# Patient Record
Sex: FEMALE | Race: WHITE | HISPANIC OR LATINO | URBAN - METROPOLITAN AREA
[De-identification: names, ages, dates, MRNs, and addresses within clinical notes are randomized per-mention and may not be internally consistent; named-entity substitution may affect disease eponyms.]

---

## 2022-11-23 ENCOUNTER — TELEPHONE (OUTPATIENT)
Dept: OBSTETRICS AND GYNECOLOGY | Facility: CLINIC | Age: 27
End: 2022-11-23

## 2022-11-23 NOTE — TELEPHONE ENCOUNTER
Pt is 27 weeks pregnant, new to the area, and wanted to come to our office for care. She's from [facility] and has had no prenatal care. Please advise.

## 2022-12-06 ENCOUNTER — INITIAL PRENATAL (OUTPATIENT)
Dept: OBSTETRICS AND GYNECOLOGY | Facility: CLINIC | Age: 27
End: 2022-12-06

## 2022-12-06 VITALS — SYSTOLIC BLOOD PRESSURE: 102 MMHG | WEIGHT: 135 LBS | DIASTOLIC BLOOD PRESSURE: 62 MMHG

## 2022-12-06 DIAGNOSIS — Z23 NEED FOR INFLUENZA VACCINATION: ICD-10-CM

## 2022-12-06 DIAGNOSIS — Z23 NEED FOR TDAP VACCINATION: ICD-10-CM

## 2022-12-06 DIAGNOSIS — Z36.9 ENCOUNTER FOR ANTENATAL SCREENING, UNSPECIFIED: Primary | ICD-10-CM

## 2022-12-06 LAB
GLUCOSE UR STRIP-MCNC: NEGATIVE MG/DL
PROT UR STRIP-MCNC: NEGATIVE MG/DL

## 2022-12-06 PROCEDURE — 90471 IMMUNIZATION ADMIN: CPT | Performed by: STUDENT IN AN ORGANIZED HEALTH CARE EDUCATION/TRAINING PROGRAM

## 2022-12-06 PROCEDURE — 90472 IMMUNIZATION ADMIN EACH ADD: CPT | Performed by: STUDENT IN AN ORGANIZED HEALTH CARE EDUCATION/TRAINING PROGRAM

## 2022-12-06 PROCEDURE — 90686 IIV4 VACC NO PRSV 0.5 ML IM: CPT | Performed by: STUDENT IN AN ORGANIZED HEALTH CARE EDUCATION/TRAINING PROGRAM

## 2022-12-06 PROCEDURE — 90715 TDAP VACCINE 7 YRS/> IM: CPT | Performed by: STUDENT IN AN ORGANIZED HEALTH CARE EDUCATION/TRAINING PROGRAM

## 2022-12-06 PROCEDURE — 99204 OFFICE O/P NEW MOD 45 MIN: CPT | Performed by: STUDENT IN AN ORGANIZED HEALTH CARE EDUCATION/TRAINING PROGRAM

## 2022-12-06 RX ORDER — PRENATAL VIT/IRON FUM/FOLIC AC 27MG-0.8MG
TABLET ORAL DAILY
COMMUNITY

## 2022-12-06 NOTE — PROGRESS NOTES
Confirmation of pregnancy     Chief Complaint   Patient presents with   • Initial Prenatal Visit         Estrellita Bobby is being seen today for evaluation of absence of menses; late to care . Due to her period being late, she tested for pregnancy and had + home UPT. She is a 27 y.o. . This problem is new to me, the examiner.       LNMP: Unsure ; thinks she is 27wga     Taking prenatal vitamins: Yes. Needs RX: Yes  Planned pregnancy: Yes  Prior obstetric issues, potential pregnancy concerns: G1  Family history of genetic issues (includes FOB): Denies  Varicella Hx: ?   Flu vaccine: Today   COVID 19 vaccine: Unsure.   History of STDs: Denies  Current medications: PNV  Last pap smear: Never ( Today)   Smoker: No  Drug or alcohol abuse: No  H/O physical, emotional or sexual abuse:Denies  H/O mental health disorder: Denies  Prior testing for Cystic Fibrosis Carrier or Sickle Cell Trait- No  Prepregnancy BMI - There is no height or weight on file to calculate BMI.    No past medical history on file.    No past surgical history on file.      Current Outpatient Medications:   •  Prenatal Vit-Fe Fumarate-FA (prenatal vitamin 27-0.8) 27-0.8 MG tablet tablet, Take  by mouth Daily., Disp: , Rfl:     No Known Allergies    Social History     Socioeconomic History   • Marital status: Significant Other       No family history on file.    Review of systems     Review of Systems  See HPI     Objective    /62   Wt 61.2 kg (135 lb)     Physical Exam    PE NOB   Vitals: VSS; AF    General Appearance:  Awake. Alert. Well developed. Well nourished. In no acute distress.    Neck:  The neck was normal; no abnormalities noted on palpation.  Trachea: midline and showed no abnormalities.  Lymph Nodes:  Nontender; no lymphadenopathy  Breasts:  General/bilateral:  ° Nipples showed no abnormalities. ° No breast asymmetry was observed. ° No scar on breast. ° No breast mass was found. ° No tenderness of the breast.  Lungs:  °  Clear to auscultation bilaterally without wheezes, rales or rhonchi.  Cardiovascular:  ° System: RRR, no murmur, consistent with normal pregnancy.  Abdomen:  Visual Inspection: ° Abdomen was normal on visual inspection.  Palpation: ° Abdomen was soft. ° Abdominal non-tender.  Genitalia:  External: ° Vulva was normal. ° Labia showed no abnormalities. ° Clitoris was normal. ° Arab's gland was normal. ° Bartholin's gland was normal. ° Genitalia exhibited no lesion.  Vagina: ° Mucosa was normal. ° Not tender. ° No vaginal mass was observed.  Cervix: ° No cervical discharge. ° Not tender.  Uterus: ° Fundal height was normal for gestational age. ° Not tender.  Uterine Adnexae: ° Normal without masses or tenderness.  Neurological:  ° Oriented to time, place, and person.  Skin:  ° General appearance was normal. No bruising or ecchymosis.  Obstetrical:     Presentation: VTX  Placenta: Anterior  MASON: 15cm       EFW  30+1 BIBIANA [id number]     Anatomy appears WNL; spine views sub optimal.       Assessment/Plan      1. Missed menses:BIBIANA [id number] with late to care DUS Oriented to practice.     2. Pregnancy: Disc importance of regular prenatal care. Enc PNV daily. Counseled on providers and on call phone. Disc Tylenol products are ok and encouraged no ibuprofen or ASA in pregnancy.  Disc exercise in pregnancy, diet, expected weight gain, etc. Enc no use of tobacco, vaping, drugs, or alcohol during pregnancy. Rev warn s/s of SAB.     3. Labs: Pt counseled on genetic screening, Quad screen, AFP, and NIPS. NIPS only to late for AFP     BMI cannot be calculated due to outdated height or weight values.  Please input a current height/weight in Vitals and re-renter BMIFOLLOWUP in Note to pull in correct documentation based on BMI range.  4.     5. Smoker- No     6.   COVID19 precautions were reviewed with the patient. Continue to encourage social distancing, wearing a mask, and good hand hygiene.  I wore a mask, protective eye wear, and  gloves during this patient encounter.  Patient also wearing a surgical mask and social distancing was observed. Hand hygeine performed before and after seeing the patient. Also encouraged COVID booster vaccine at 6 month interval from last COVID vaccine. Info provided on Covid vaccine: Declines     7.  Flu vaccine. Encouraged the flu vaccine during pregnancy. Discuss normal physiological changes during pregnancy increase the susceptibility of the flu virus and increase the risk of severe illness for the pregnant woman. Disc flu can be harmful to the unborn baby as well. Enc the flu vaccine. Disc with patient that getting the flu vaccine is the first and most important step in protecting against the flu. Flu vaccines given during pregnancy help protect both the mother and the baby. Getting the flu vaccine during pregnancy also helps protect the  from flu illness for several months after their birth, when then are too young to get vaccinated. Also disc the importance of good hand hygiene and avoiding people who are sick. The patient received the flu vaccine.     8. Tdap today     9. Pap screen today           All questions answered.     Counseling was given to patient for the following topics: diagnostic results, prognosis and patient and family education . Total time of the encounter was 30 minutes and 30 minutes was spend counseling.    Encounter Diagnoses   Name Primary?   • Encounter for  screening, unspecified Yes       Diagnoses and all orders for this visit:    Encounter for  screening, unspecified  -     Gestational GTT 2 Hr ([facility])  -     Cancel: Hemoglobin & Hematocrit, Blood  -     Cancel: Obstetric Panel  -     Cancel: HIV-1 / O / 2 Ag / Antibody 4th Generation  -     Cancel: Urine Culture - Urine, Urine, Random Void  -     Cancel: Hemoglobin A1c  -     Cancel: Urine Drug Screen - Urine, Clean Catch  -     Cancel: Varicella Zoster Antibody, IgG  -     Cancel: Hemoglobinopathy  Fractionation Cascade  -     Cancel: IGP,CtNgTv,rfx Aptima HPV ASCU  -     Obstetric Panel  -     HIV-1 / O / 2 Ag / Antibody 4th Generation  -     Urine Culture - Urine, Urine, Random Void  -     Hemoglobin A1c  -     Urine Drug Screen - Urine, Random Void  -     Varicella Zoster Antibody, IgG  -     Hemoglobinopathy Fractionation Cascade  -     IGP,CtNgTv,rfx Aptima HPV ASCU    Other orders  -     Prenatal Vit-Fe Fumarate-FA (prenatal vitamin 27-0.8) 27-0.8 MG tablet tablet; Take  by mouth Daily.  -     POC Urinalysis Dipstick        RTO in 2 weeks for new OB exam, labs and dating ultrasound.   Can perform growth; risk for GDM is  race. EFW at 34wga recommended ; A1c pending ; late to care for gtt     Jericho Ceron DO  12/6/2022  09:21 EST

## 2022-12-07 LAB
ABO GROUP BLD: ABNORMAL
BASOPHILS # BLD AUTO: 0 X10E3/UL (ref 0–0.2)
BASOPHILS NFR BLD AUTO: 0 %
BLD GP AB SCN SERPL QL: NEGATIVE
EOSINOPHIL # BLD AUTO: 0 X10E3/UL (ref 0–0.4)
EOSINOPHIL NFR BLD AUTO: 0 %
ERYTHROCYTE [DISTWIDTH] IN BLOOD BY AUTOMATED COUNT: 13.7 % (ref 11.7–15.4)
HBA1C MFR BLD: 5.1 % (ref 4.8–5.6)
HBV SURFACE AG SERPL QL IA: NEGATIVE
HCT VFR BLD AUTO: 32.5 % (ref 34–46.6)
HCV AB S/CO SERPL IA: <0.1 S/CO RATIO (ref 0–0.9)
HGB A MFR BLD ELPH: 97.3 % (ref 96.4–98.8)
HGB A2 MFR BLD ELPH: 2.7 % (ref 1.8–3.2)
HGB BLD-MCNC: 10.4 G/DL (ref 11.1–15.9)
HGB F MFR BLD ELPH: 0 % (ref 0–2)
HGB FRACT BLD-IMP: NORMAL
HGB S MFR BLD ELPH: 0 %
HIV 1+2 AB+HIV1 P24 AG SERPL QL IA: NON REACTIVE
IMM GRANULOCYTES # BLD AUTO: 0 X10E3/UL (ref 0–0.1)
IMM GRANULOCYTES NFR BLD AUTO: 0 %
LYMPHOCYTES # BLD AUTO: 1.4 X10E3/UL (ref 0.7–3.1)
LYMPHOCYTES NFR BLD AUTO: 20 %
MCH RBC QN AUTO: 27.6 PG (ref 26.6–33)
MCHC RBC AUTO-ENTMCNC: 32 G/DL (ref 31.5–35.7)
MCV RBC AUTO: 86 FL (ref 79–97)
MONOCYTES # BLD AUTO: 0.4 X10E3/UL (ref 0.1–0.9)
MONOCYTES NFR BLD AUTO: 5 %
NEUTROPHILS # BLD AUTO: 5 X10E3/UL (ref 1.4–7)
NEUTROPHILS NFR BLD AUTO: 75 %
PLATELET # BLD AUTO: 361 X10E3/UL (ref 150–450)
RBC # BLD AUTO: 3.77 X10E6/UL (ref 3.77–5.28)
RH BLD: POSITIVE
RPR SER QL: NON REACTIVE
RUBV IGG SERPL IA-ACNC: 2.72 INDEX
VZV IGG SER IA-ACNC: 169 INDEX
WBC # BLD AUTO: 6.8 X10E3/UL (ref 3.4–10.8)

## 2022-12-07 RX ORDER — DOXYCYCLINE HYCLATE 50 MG/1
324 CAPSULE, GELATIN COATED ORAL
Qty: 30 TABLET | Refills: 6 | Status: SHIPPED | OUTPATIENT
Start: 2022-12-07 | End: 2023-01-23 | Stop reason: SDUPTHER

## 2022-12-08 ENCOUNTER — PATIENT OUTREACH (OUTPATIENT)
Dept: LABOR AND DELIVERY | Facility: HOSPITAL | Age: 27
End: 2022-12-08

## 2022-12-08 ENCOUNTER — REFERRAL TRIAGE (OUTPATIENT)
Dept: LABOR AND DELIVERY | Facility: HOSPITAL | Age: 27
End: 2022-12-08

## 2022-12-08 LAB
AMPHETAMINES UR QL SCN: NEGATIVE NG/ML
BACTERIA UR CULT: NO GROWTH
BACTERIA UR CULT: NORMAL
BARBITURATES UR QL SCN: NEGATIVE NG/ML
BENZODIAZ UR QL SCN: NEGATIVE NG/ML
BZE UR QL SCN: NEGATIVE NG/ML
CANNABINOIDS UR QL SCN: NEGATIVE NG/ML
CREAT UR-MCNC: 68.7 MG/DL (ref 20–300)
LABORATORY COMMENT REPORT: NORMAL
METHADONE UR QL SCN: NEGATIVE NG/ML
OPIATES UR QL SCN: NEGATIVE NG/ML
OXYCODONE+OXYMORPHONE UR QL SCN: NEGATIVE NG/ML
PCP UR QL: NEGATIVE NG/ML
PH UR: 7.4 [PH] (ref 4.5–8.9)
PROPOXYPH UR QL SCN: NEGATIVE NG/ML

## 2022-12-08 NOTE — OUTREACH NOTE
[facility]  Unable to Reach       Questions/Answers    Flowsheet Row Responses   Pending Outreach Confirm Patient Interest   Call Attempt First   Outcome Left message   Next Call Attempt Date 12/15/22          No mychart to send msg.    Davey Blum RN  Maternity Nurse Navigator    12/8/2022, 12:36 EST

## 2022-12-10 LAB
CFDNA.FET/CFDNA.TOTAL SFR FETUS: NORMAL %
CITATION REF LAB TEST: NORMAL
FET 13+18+21+X+Y ANEUP PLAS.CFDNA: NEGATIVE
FET CHR 21 TS PLAS.CFDNA QL: NEGATIVE
FET SEX PLAS.CFDNA DOSAGE CFDNA: NORMAL
FET TS 13 RISK PLAS.CFDNA QL: NEGATIVE
FET TS 18 RISK WBC.DNA+CFDNA QL: NEGATIVE
GA EST FROM CONCEPTION DATE: NORMAL D
GESTATIONAL AGE > 9:: YES
LAB DIRECTOR NAME PROVIDER: NORMAL
LAB DIRECTOR NAME PROVIDER: NORMAL
LABORATORY COMMENT REPORT: NORMAL
LIMITATIONS OF THE TEST: NORMAL
NEGATIVE PREDICTIVE VALUE: NORMAL
NOTE: NORMAL
PERFORMANCE CHARACTERISTICS: NORMAL
POSITIVE PREDICTIVE VALUE: NORMAL
REF LAB TEST METHOD: NORMAL
TEST PERFORMANCE INFO SPEC: NORMAL

## 2022-12-12 LAB
C TRACH RRNA CVX QL NAA+PROBE: NEGATIVE
CONV .: NORMAL
CYSTIC FIBROSIS MUTATION 97: NORMAL
CYTOLOGIST CVX/VAG CYTO: NORMAL
CYTOLOGY CVX/VAG DOC CYTO: NORMAL
CYTOLOGY CVX/VAG DOC THIN PREP: NORMAL
DX ICD CODE: NORMAL
GENE DIS ANL CARRIER INTERP-IMP: NORMAL
HIV 1 & 2 AB SER-IMP: NORMAL
N GONORRHOEA RRNA CVX QL NAA+PROBE: NEGATIVE
OTHER STN SPEC: NORMAL
STAT OF ADQ CVX/VAG CYTO-IMP: NORMAL
T VAGINALIS RRNA SPEC QL NAA+PROBE: NEGATIVE

## 2022-12-14 LAB
CLINICAL GENETICS COUNSELING NOTE: NORMAL
CLINICAL INFO: NORMAL
ETHNIC BACKGROUND STATED: NORMAL
FMR1 GENE CGG RPT BLD/T QL: NORMAL
LAB DIRECTOR NAME PROVIDER: NORMAL
LABORATORY COMMENT REPORT: NORMAL
LABORATORY COMMENT REPORT: NORMAL
REASON FOR REFERRAL (NARRATIVE): NORMAL
REF LAB TEST METHOD: NORMAL
SMN1 GENE MUT ANL BLD/T: NORMAL
SPECIMEN SOURCE: NORMAL
TEST PERFORMANCE INFO SPEC: NORMAL
TEST PERFORMANCE INFO SPEC: NORMAL

## 2022-12-15 ENCOUNTER — PATIENT OUTREACH (OUTPATIENT)
Dept: LABOR AND DELIVERY | Facility: HOSPITAL | Age: 27
End: 2022-12-15

## 2022-12-15 NOTE — OUTREACH NOTE
[facility]  Unable to Reach       Questions/Answers    Flowsheet Row Responses   Pending Outreach Confirm Patient Interest   Call Attempt Second   Outcome Not available   Next Call Attempt Date 02/01/23          F/U 2/1/23, which closer to BIBIANA. UTR x2.    Davey Blum, RN  Maternity Nurse Navigator    12/15/2022, 12:31 EST

## 2022-12-19 ENCOUNTER — ROUTINE PRENATAL (OUTPATIENT)
Dept: OBSTETRICS AND GYNECOLOGY | Facility: CLINIC | Age: 27
End: 2022-12-19

## 2022-12-19 VITALS — SYSTOLIC BLOOD PRESSURE: 118 MMHG | DIASTOLIC BLOOD PRESSURE: 70 MMHG | WEIGHT: 138 LBS

## 2022-12-19 DIAGNOSIS — D50.9 IRON DEFICIENCY ANEMIA DURING PREGNANCY: Primary | ICD-10-CM

## 2022-12-19 DIAGNOSIS — Z3A.32 32 WEEKS GESTATION OF PREGNANCY: ICD-10-CM

## 2022-12-19 DIAGNOSIS — O99.019 IRON DEFICIENCY ANEMIA DURING PREGNANCY: Primary | ICD-10-CM

## 2022-12-19 DIAGNOSIS — Z78.9 USES SPANISH AS PRIMARY SPOKEN LANGUAGE: ICD-10-CM

## 2022-12-19 DIAGNOSIS — O09.30: ICD-10-CM

## 2022-12-19 LAB
GLUCOSE UR STRIP-MCNC: NEGATIVE MG/DL
PROT UR STRIP-MCNC: NEGATIVE MG/DL

## 2022-12-19 PROCEDURE — 99214 OFFICE O/P EST MOD 30 MIN: CPT | Performed by: STUDENT IN AN ORGANIZED HEALTH CARE EDUCATION/TRAINING PROGRAM

## 2022-12-19 NOTE — PROGRESS NOTES
Ob follow up      Estrellita Bobby is a 27 y.o.  32w0d patient being seen today for her obstetrical visit for spine views on US . Patient reports no complaints. Fetal movement: normal.       ROS - Denies leaking fluid, vaginal bleeding and notes good fetal movement.     /70   Wt 62.6 kg (138 lb)     Vitals: VSS; AF    General Appearance:  Awake. Alert. Well developed. Well nourished. In no acute distress.    Visual Inspection: ° Abdomen was normal on visual inspection.  Palpation: ° Abdomen was soft. ° Abdominal non-tender.    Uterus: ° Fundal height was normal for gestational age. ° Not tender.  Uterine Adnexae: ° Normal without masses or tenderness.  Neurological:  ° Oriented to time, place, and person.  Skin:  ° General appearance was normal. No bruising or ecchymosis.  Obstetrical:    Presentation: VTX  Placenta: Anterior  MASON: 14.06cm  CVX length: 4.91cm  's and + FM     Anatomy appears WNL; Spine views WNL today     A/P      1) 26 y.o.  -pregnancy at 32+0 RH+  Spine views today   NIPT low risk ( Missed AFP)        2) flu vaccine received     3) Low risk  CF/SMA/FX     4) Late to care missed 2hr gtt   A1c WNL 5.1  Growth 36wga ( )      5) Kinyarwanda primary language      6) Tdap received    7) Anemia   Rx      8)Reviewed this stage of pregnancy     9)Problem list updated              Plan    PTL warnings   Kick counts reviewed   FU 2 weeks, Growth 36wga     I spent 30 minutes caring for Estrellita on this date of service. This time includes time spent by me in the following activities: preparing for the visit, reviewing tests, obtaining and/or reviewing a separately obtained history, performing a medically appropriate examination and/or evaluation, counseling and educating the patient/family/caregiver, ordering medications, tests, or procedures, documenting information in the medical record, independently interpreting results and communicating that information with the  patient/family/caregiver and care coordination       Jericho Ceron, DO     12/19/2022  10:35 EST

## 2023-01-04 ENCOUNTER — ROUTINE PRENATAL (OUTPATIENT)
Dept: OBSTETRICS AND GYNECOLOGY | Facility: CLINIC | Age: 28
End: 2023-01-04
Payer: MEDICAID

## 2023-01-04 VITALS — SYSTOLIC BLOOD PRESSURE: 112 MMHG | DIASTOLIC BLOOD PRESSURE: 60 MMHG | WEIGHT: 138.6 LBS

## 2023-01-04 DIAGNOSIS — O99.019 IRON DEFICIENCY ANEMIA DURING PREGNANCY: ICD-10-CM

## 2023-01-04 DIAGNOSIS — Z3A.34 34 WEEKS GESTATION OF PREGNANCY: ICD-10-CM

## 2023-01-04 DIAGNOSIS — Z78.9 USES SPANISH AS PRIMARY SPOKEN LANGUAGE: Primary | ICD-10-CM

## 2023-01-04 DIAGNOSIS — D50.9 IRON DEFICIENCY ANEMIA DURING PREGNANCY: ICD-10-CM

## 2023-01-04 DIAGNOSIS — O09.30: ICD-10-CM

## 2023-01-04 PROBLEM — Z3A.32 32 WEEKS GESTATION OF PREGNANCY: Status: RESOLVED | Noted: 2022-12-19 | Resolved: 2023-01-04

## 2023-01-04 LAB
GLUCOSE UR STRIP-MCNC: NEGATIVE MG/DL
PROT UR STRIP-MCNC: NEGATIVE MG/DL

## 2023-01-04 PROCEDURE — 99213 OFFICE O/P EST LOW 20 MIN: CPT | Performed by: STUDENT IN AN ORGANIZED HEALTH CARE EDUCATION/TRAINING PROGRAM

## 2023-01-04 NOTE — PROGRESS NOTES
Ob follow up      Estrellita Bobby is a 27 y.o.  34w2d patient being seen today for her obstetrical visit  . Patient reports no complaints. Fetal movement: normal.       ROS - Denies leaking fluid, vaginal bleeding and notes good fetal movement.     /60   Wt 62.9 kg (138 lb 9.6 oz)     Vitals: VSS; AF    General Appearance:  Awake. Alert. Well developed. Well nourished. In no acute distress.    Visual Inspection: ° Abdomen was normal on visual inspection.  Palpation: ° Abdomen was soft. ° Abdominal non-tender.    Uterus: ° Fundal height was normal for gestational age. ° Not tender.  Uterine Adnexae: ° Normal without masses or tenderness.  Neurological:  ° Oriented to time, place, and person.  Skin:  ° General appearance was normal. No bruising or ecchymosis.  Obstetrical:  + FM and FCA     A/P      1) 26 y.o.  -pregnancy at 34+2     NIPT low risk ( Missed AFP)        2) flu vaccine received     3) Low risk  CF/SMA/FX     4) Late to care missed 2hr gtt   A1c WNL 5.1  Growth 36wga ( )      5) French primary language      6) Tdap received    7) Anemia   Rx , CBC on admission      8)Reviewed this stage of pregnancy     9)Problem list updated              Plan    PTL warnings   Kick counts reviewed   FU 2 weeks OB, Growth @36wga, GBS, presentation      I spent 30 minutes caring for Estrellita on this date of service. This time includes time spent by me in the following activities: preparing for the visit, reviewing tests, obtaining and/or reviewing a separately obtained history, performing a medically appropriate examination and/or evaluation, counseling and educating the patient/family/caregiver, ordering medications, tests, or procedures, documenting information in the medical record, independently interpreting results and communicating that information with the patient/family/caregiver and care coordination       Jericho Ceron, DO     2023  11:38 EST

## 2023-01-23 ENCOUNTER — ROUTINE PRENATAL (OUTPATIENT)
Dept: OBSTETRICS AND GYNECOLOGY | Facility: CLINIC | Age: 28
End: 2023-01-23

## 2023-01-23 VITALS — DIASTOLIC BLOOD PRESSURE: 86 MMHG | SYSTOLIC BLOOD PRESSURE: 120 MMHG | WEIGHT: 140.2 LBS

## 2023-01-23 DIAGNOSIS — O99.019 IRON DEFICIENCY ANEMIA DURING PREGNANCY: ICD-10-CM

## 2023-01-23 DIAGNOSIS — Z36.85 ENCOUNTER FOR ANTENATAL SCREENING FOR STREPTOCOCCUS B: ICD-10-CM

## 2023-01-23 DIAGNOSIS — D50.9 IRON DEFICIENCY ANEMIA DURING PREGNANCY: ICD-10-CM

## 2023-01-23 DIAGNOSIS — Z34.93 PRENATAL CARE IN THIRD TRIMESTER: Primary | ICD-10-CM

## 2023-01-23 DIAGNOSIS — Z78.9 USES SPANISH AS PRIMARY SPOKEN LANGUAGE: ICD-10-CM

## 2023-01-23 DIAGNOSIS — R93.89 ABNORMAL ULTRASOUND: ICD-10-CM

## 2023-01-23 LAB
ERYTHROCYTE [DISTWIDTH] IN BLOOD BY AUTOMATED COUNT: 17.1 % (ref 12.3–15.4)
GLUCOSE UR STRIP-MCNC: NEGATIVE MG/DL
HCT VFR BLD AUTO: 35.1 % (ref 34–46.6)
HGB BLD-MCNC: 11.5 G/DL (ref 12–15.9)
MCH RBC QN AUTO: 28.2 PG (ref 26.6–33)
MCHC RBC AUTO-ENTMCNC: 32.8 G/DL (ref 31.5–35.7)
MCV RBC AUTO: 86 FL (ref 79–97)
PLATELET # BLD AUTO: 247 10*3/MM3 (ref 140–450)
PROT UR STRIP-MCNC: NEGATIVE MG/DL
RBC # BLD AUTO: 4.08 10*6/MM3 (ref 3.77–5.28)
WBC # BLD AUTO: 8.53 10*3/MM3 (ref 3.4–10.8)

## 2023-01-23 PROCEDURE — 99214 OFFICE O/P EST MOD 30 MIN: CPT | Performed by: NURSE PRACTITIONER

## 2023-01-23 RX ORDER — DOXYCYCLINE HYCLATE 50 MG/1
324 CAPSULE, GELATIN COATED ORAL
Qty: 30 TABLET | Refills: 6 | Status: SHIPPED | OUTPATIENT
Start: 2023-01-23

## 2023-01-23 NOTE — PROGRESS NOTES
OB follow up > 20 weeks    Chief Complaint   Patient presents with   • Routine Prenatal Visit       Estrellita Bobby is a 27 y.o.  37w0d being seen today for her obstetrical visit.  This patient is new to me - yes.  Patient reports no complaints. Taking prenatal vitamins: Yes.  Late prenatal care. Here with spouse - speaks some English. FM+, no OB concerns.      Review of Systems  Constitutional: neg  Cardiovascular: neg  Gastrointestinal: nausea  Genitourinary: Negative for contractions, cramping, vaginal bleeding, or SROM.   Fetal movement: normal  No Known Allergies     /86   Wt 63.6 kg (140 lb 3.2 oz)     FHT: 130's BPM   Uterine Size: size equals dates     Declined SVE today    Assessment    1) pregnancy at 37w0d, Rh+    2) late prenatal care - US IMP: growth 15.5%, MASON 11.43cm, VTX, , anterior placenta, HC < 1% - new finding on US; referred to M  NIPT low risk (Missed AFP)   missed 2hr gtt   A1c WNL 5.1     3) Low risk  CF/SMA/FX     4) GBS done today    5) anemia - hgb 10.4 g/d.  Continue iron supplement.  ERX sent to pharmacy.  Check CBC today.    6) Luxembourger-speaking    7) s/p Flu vaccine and Tdap vaccine      Plan    Continue prenatal vitamins  Reviewed this stage of pregnancy and labor warning signs - provided handout  Problem list updated   Follow up in 1 week    Hedy Albarado, APRN  2023  14:17 EST

## 2023-01-25 LAB — GP B STREP DNA SPEC QL NAA+PROBE: NEGATIVE

## 2023-01-30 ENCOUNTER — ROUTINE PRENATAL (OUTPATIENT)
Dept: OBSTETRICS AND GYNECOLOGY | Facility: CLINIC | Age: 28
End: 2023-01-30

## 2023-01-30 VITALS — DIASTOLIC BLOOD PRESSURE: 92 MMHG | SYSTOLIC BLOOD PRESSURE: 138 MMHG | WEIGHT: 143.2 LBS

## 2023-01-30 DIAGNOSIS — I10 HYPERTENSION, UNSPECIFIED TYPE: Primary | ICD-10-CM

## 2023-01-30 LAB
GLUCOSE UR STRIP-MCNC: NEGATIVE MG/DL
PROT UR STRIP-MCNC: NEGATIVE MG/DL

## 2023-01-30 PROCEDURE — 99214 OFFICE O/P EST MOD 30 MIN: CPT | Performed by: NURSE PRACTITIONER

## 2023-01-31 LAB
ALBUMIN SERPL-MCNC: 3.4 G/DL (ref 3.5–5.2)
ALBUMIN/GLOB SERPL: 1.1 G/DL
ALP SERPL-CCNC: 252 U/L (ref 39–117)
ALT SERPL-CCNC: 17 U/L (ref 1–33)
AST SERPL-CCNC: 25 U/L (ref 1–32)
BASOPHILS # BLD AUTO: 0.02 10*3/MM3 (ref 0–0.2)
BASOPHILS NFR BLD AUTO: 0.3 % (ref 0–1.5)
BILIRUB SERPL-MCNC: <0.2 MG/DL (ref 0–1.2)
BUN SERPL-MCNC: 7 MG/DL (ref 6–20)
BUN/CREAT SERPL: 14.3 (ref 7–25)
CALCIUM SERPL-MCNC: 9 MG/DL (ref 8.6–10.5)
CHLORIDE SERPL-SCNC: 104 MMOL/L (ref 98–107)
CO2 SERPL-SCNC: 21.2 MMOL/L (ref 22–29)
CREAT SERPL-MCNC: 0.49 MG/DL (ref 0.57–1)
EGFRCR SERPLBLD CKD-EPI 2021: 131.8 ML/MIN/1.73
EOSINOPHIL # BLD AUTO: 0.03 10*3/MM3 (ref 0–0.4)
EOSINOPHIL NFR BLD AUTO: 0.4 % (ref 0.3–6.2)
ERYTHROCYTE [DISTWIDTH] IN BLOOD BY AUTOMATED COUNT: 17.1 % (ref 12.3–15.4)
GLOBULIN SER CALC-MCNC: 3.2 GM/DL
GLUCOSE SERPL-MCNC: 100 MG/DL (ref 65–99)
HCT VFR BLD AUTO: 35.1 % (ref 34–46.6)
HGB BLD-MCNC: 11.8 G/DL (ref 12–15.9)
IMM GRANULOCYTES # BLD AUTO: 0.03 10*3/MM3 (ref 0–0.05)
IMM GRANULOCYTES NFR BLD AUTO: 0.4 % (ref 0–0.5)
LYMPHOCYTES # BLD AUTO: 1.42 10*3/MM3 (ref 0.7–3.1)
LYMPHOCYTES NFR BLD AUTO: 18.9 % (ref 19.6–45.3)
MCH RBC QN AUTO: 29.2 PG (ref 26.6–33)
MCHC RBC AUTO-ENTMCNC: 33.6 G/DL (ref 31.5–35.7)
MCV RBC AUTO: 86.9 FL (ref 79–97)
MONOCYTES # BLD AUTO: 0.55 10*3/MM3 (ref 0.1–0.9)
MONOCYTES NFR BLD AUTO: 7.3 % (ref 5–12)
NEUTROPHILS # BLD AUTO: 5.47 10*3/MM3 (ref 1.7–7)
NEUTROPHILS NFR BLD AUTO: 72.7 % (ref 42.7–76)
NRBC BLD AUTO-RTO: 0 /100 WBC (ref 0–0.2)
PLATELET # BLD AUTO: 218 10*3/MM3 (ref 140–450)
POTASSIUM SERPL-SCNC: 3.7 MMOL/L (ref 3.5–5.2)
PROT SERPL-MCNC: 6.6 G/DL (ref 6–8.5)
RBC # BLD AUTO: 4.04 10*6/MM3 (ref 3.77–5.28)
SODIUM SERPL-SCNC: 136 MMOL/L (ref 136–145)
WBC # BLD AUTO: 7.52 10*3/MM3 (ref 3.4–10.8)

## 2023-02-03 ENCOUNTER — HOSPITAL ENCOUNTER (OUTPATIENT)
Dept: ULTRASOUND IMAGING | Facility: HOSPITAL | Age: 28
Discharge: HOME | End: 2023-02-03
Admitting: STUDENT IN AN ORGANIZED HEALTH CARE EDUCATION/TRAINING PROGRAM
Payer: MEDICAID

## 2023-02-03 ENCOUNTER — OFFICE VISIT (OUTPATIENT)
Dept: OBSTETRICS AND GYNECOLOGY | Facility: CLINIC | Age: 28
End: 2023-02-03
Payer: MEDICAID

## 2023-02-03 VITALS
SYSTOLIC BLOOD PRESSURE: 128 MMHG | HEIGHT: 61 IN | WEIGHT: 143 LBS | DIASTOLIC BLOOD PRESSURE: 71 MMHG | HEART RATE: 91 BPM | TEMPERATURE: 98.4 F | BODY MASS INDEX: 27 KG/M2

## 2023-02-03 DIAGNOSIS — Z03.73 FETAL ANOMALY SUSPECTED BUT NOT FOUND: ICD-10-CM

## 2023-02-03 DIAGNOSIS — Q25.1 COARCTATION OF AORTA: Primary | ICD-10-CM

## 2023-02-03 DIAGNOSIS — O35.HXX0 SHORT FEMUR OF FETUS ON PRENATAL ULTRASOUND: ICD-10-CM

## 2023-02-03 PROCEDURE — 76819 FETAL BIOPHYS PROFIL W/O NST: CPT | Performed by: OBSTETRICS & GYNECOLOGY

## 2023-02-03 PROCEDURE — 76819 FETAL BIOPHYS PROFIL W/O NST: CPT

## 2023-02-03 PROCEDURE — 76811 OB US DETAILED SNGL FETUS: CPT

## 2023-02-03 PROCEDURE — 76811 OB US DETAILED SNGL FETUS: CPT | Performed by: OBSTETRICS & GYNECOLOGY

## 2023-02-03 PROCEDURE — 99215 OFFICE O/P EST HI 40 MIN: CPT | Performed by: OBSTETRICS & GYNECOLOGY

## 2023-02-03 NOTE — PROGRESS NOTES
ID #[id number] used for m appointment today. Pt reports that she is doing well and denies vaginal bleeding, cramping, contractions or LOF at this time. Reports active fetal movement. Denies HA and epigastric pain but reports a recent finding of seeing spots in her vision. Notes that she is returning a 24 hour urine this afternoon after MFM appointment and will get another OB appointment scheduled at that time.     Vitals:    02/03/23 1158   BP: 128/71   Pulse: 91   Temp:

## 2023-02-06 ENCOUNTER — HOSPITAL ENCOUNTER (INPATIENT)
Facility: HOSPITAL | Age: 28
LOS: 3 days | Discharge: HOME | End: 2023-02-09
Attending: OBSTETRICS & GYNECOLOGY | Admitting: OBSTETRICS & GYNECOLOGY
Payer: MEDICAID

## 2023-02-06 ENCOUNTER — HOSPITAL ENCOUNTER (INPATIENT)
Dept: LABOR AND DELIVERY | Facility: HOSPITAL | Age: 28
Discharge: HOME | End: 2023-02-06
Payer: MEDICAID

## 2023-02-06 ENCOUNTER — TELEPHONE (OUTPATIENT)
Dept: OBSTETRICS AND GYNECOLOGY | Facility: CLINIC | Age: 28
End: 2023-02-06
Payer: MEDICAID

## 2023-02-06 PROBLEM — Z34.90 PREGNANCY: Status: ACTIVE | Noted: 2023-02-06

## 2023-02-06 LAB
ABO GROUP BLD: NORMAL
ALBUMIN SERPL-MCNC: 3.4 G/DL (ref 3.5–5.2)
ALBUMIN/GLOB SERPL: 1 G/DL
ALP SERPL-CCNC: 251 U/L (ref 39–117)
ALT SERPL W P-5'-P-CCNC: 13 U/L (ref 1–33)
ANION GAP SERPL CALCULATED.3IONS-SCNC: 11.2 MMOL/L (ref 5–15)
AST SERPL-CCNC: 21 U/L (ref 1–32)
BASOPHILS # BLD AUTO: 0.02 10*3/MM3 (ref 0–0.2)
BASOPHILS NFR BLD AUTO: 0.3 % (ref 0–1.5)
BILIRUB SERPL-MCNC: <0.2 MG/DL (ref 0–1.2)
BLD GP AB SCN SERPL QL: NEGATIVE
BUN SERPL-MCNC: 8 MG/DL (ref 6–20)
BUN/CREAT SERPL: 15.4 (ref 7–25)
CALCIUM SPEC-SCNC: 8.4 MG/DL (ref 8.6–10.5)
CHLORIDE SERPL-SCNC: 106 MMOL/L (ref 98–107)
CO2 SERPL-SCNC: 19.8 MMOL/L (ref 22–29)
CREAT SERPL-MCNC: 0.52 MG/DL (ref 0.57–1)
DEPRECATED RDW RBC AUTO: 58.4 FL (ref 37–54)
EGFRCR SERPLBLD CKD-EPI 2021: 130 ML/MIN/1.73
EOSINOPHIL # BLD AUTO: 0.04 10*3/MM3 (ref 0–0.4)
EOSINOPHIL NFR BLD AUTO: 0.6 % (ref 0.3–6.2)
ERYTHROCYTE [DISTWIDTH] IN BLOOD BY AUTOMATED COUNT: 18 % (ref 12.3–15.4)
GLOBULIN UR ELPH-MCNC: 3.5 GM/DL
GLUCOSE SERPL-MCNC: 85 MG/DL (ref 65–99)
HCT VFR BLD AUTO: 38.1 % (ref 34–46.6)
HGB BLD-MCNC: 12.1 G/DL (ref 12–15.9)
IMM GRANULOCYTES # BLD AUTO: 0.02 10*3/MM3 (ref 0–0.05)
IMM GRANULOCYTES NFR BLD AUTO: 0.3 % (ref 0–0.5)
LYMPHOCYTES # BLD AUTO: 1.77 10*3/MM3 (ref 0.7–3.1)
LYMPHOCYTES NFR BLD AUTO: 27 % (ref 19.6–45.3)
MCH RBC QN AUTO: 28.1 PG (ref 26.6–33)
MCHC RBC AUTO-ENTMCNC: 31.8 G/DL (ref 31.5–35.7)
MCV RBC AUTO: 88.6 FL (ref 79–97)
MONOCYTES # BLD AUTO: 0.45 10*3/MM3 (ref 0.1–0.9)
MONOCYTES NFR BLD AUTO: 6.9 % (ref 5–12)
NEUTROPHILS NFR BLD AUTO: 4.26 10*3/MM3 (ref 1.7–7)
NEUTROPHILS NFR BLD AUTO: 64.9 % (ref 42.7–76)
NRBC BLD AUTO-RTO: 0 /100 WBC (ref 0–0.2)
PLATELET # BLD AUTO: 234 10*3/MM3 (ref 140–450)
PMV BLD AUTO: 11 FL (ref 6–12)
POTASSIUM SERPL-SCNC: 3.7 MMOL/L (ref 3.5–5.2)
PROT SERPL-MCNC: 6.9 G/DL (ref 6–8.5)
RBC # BLD AUTO: 4.3 10*6/MM3 (ref 3.77–5.28)
RH BLD: POSITIVE
SODIUM SERPL-SCNC: 137 MMOL/L (ref 136–145)
T&S EXPIRATION DATE: NORMAL
WBC NRBC COR # BLD: 6.56 10*3/MM3 (ref 3.4–10.8)

## 2023-02-06 PROCEDURE — 85025 COMPLETE CBC W/AUTO DIFF WBC: CPT | Performed by: OBSTETRICS & GYNECOLOGY

## 2023-02-06 PROCEDURE — 86901 BLOOD TYPING SEROLOGIC RH(D): CPT | Performed by: OBSTETRICS & GYNECOLOGY

## 2023-02-06 PROCEDURE — 86850 RBC ANTIBODY SCREEN: CPT | Performed by: OBSTETRICS & GYNECOLOGY

## 2023-02-06 PROCEDURE — 80053 COMPREHEN METABOLIC PANEL: CPT | Performed by: OBSTETRICS & GYNECOLOGY

## 2023-02-06 PROCEDURE — 3E0P7VZ INTRODUCTION OF HORMONE INTO FEMALE REPRODUCTIVE, VIA NATURAL OR ARTIFICIAL OPENING: ICD-10-PCS | Performed by: OBSTETRICS & GYNECOLOGY

## 2023-02-06 PROCEDURE — 86900 BLOOD TYPING SEROLOGIC ABO: CPT | Performed by: OBSTETRICS & GYNECOLOGY

## 2023-02-06 RX ORDER — FAMOTIDINE 20 MG/1
20 TABLET, FILM COATED ORAL 2 TIMES DAILY PRN
Status: DISCONTINUED | OUTPATIENT
Start: 2023-02-06 | End: 2023-02-07 | Stop reason: HOSPADM

## 2023-02-06 RX ORDER — ONDANSETRON 4 MG/1
4 TABLET, FILM COATED ORAL EVERY 6 HOURS PRN
Status: DISCONTINUED | OUTPATIENT
Start: 2023-02-06 | End: 2023-02-07 | Stop reason: HOSPADM

## 2023-02-06 RX ORDER — FENTANYL CIT 0.2 MG/100ML-ROPIV 0.2%-NACL 0.9% EPIDURAL INJ 2/0.2 MCG/ML-%
10 SOLUTION INJECTION CONTINUOUS
Status: ACTIVE | OUTPATIENT
Start: 2023-02-06 | End: 2023-02-09

## 2023-02-06 RX ORDER — SODIUM CHLORIDE, SODIUM LACTATE, POTASSIUM CHLORIDE, CALCIUM CHLORIDE 600; 310; 30; 20 MG/100ML; MG/100ML; MG/100ML; MG/100ML
125 INJECTION, SOLUTION INTRAVENOUS CONTINUOUS
Status: DISCONTINUED | OUTPATIENT
Start: 2023-02-06 | End: 2023-02-09 | Stop reason: HOSPADM

## 2023-02-06 RX ORDER — SODIUM CHLORIDE 0.9 % (FLUSH) 0.9 %
10 SYRINGE (ML) INJECTION EVERY 12 HOURS SCHEDULED
Status: DISCONTINUED | OUTPATIENT
Start: 2023-02-06 | End: 2023-02-07 | Stop reason: HOSPADM

## 2023-02-06 RX ORDER — DIPHENHYDRAMINE HYDROCHLORIDE 50 MG/ML
12.5 INJECTION INTRAMUSCULAR; INTRAVENOUS EVERY 8 HOURS PRN
Status: DISCONTINUED | OUTPATIENT
Start: 2023-02-06 | End: 2023-02-07 | Stop reason: HOSPADM

## 2023-02-06 RX ORDER — SODIUM CHLORIDE 0.9 % (FLUSH) 0.9 %
10 SYRINGE (ML) INJECTION AS NEEDED
Status: DISCONTINUED | OUTPATIENT
Start: 2023-02-06 | End: 2023-02-07 | Stop reason: HOSPADM

## 2023-02-06 RX ORDER — ACETAMINOPHEN 325 MG/1
650 TABLET ORAL EVERY 4 HOURS PRN
Status: DISCONTINUED | OUTPATIENT
Start: 2023-02-06 | End: 2023-02-07 | Stop reason: HOSPADM

## 2023-02-06 RX ORDER — FAMOTIDINE 10 MG/ML
20 INJECTION, SOLUTION INTRAVENOUS 2 TIMES DAILY PRN
Status: DISCONTINUED | OUTPATIENT
Start: 2023-02-06 | End: 2023-02-07 | Stop reason: HOSPADM

## 2023-02-06 RX ORDER — TERBUTALINE SULFATE 1 MG/ML
0.25 INJECTION, SOLUTION SUBCUTANEOUS AS NEEDED
Status: DISCONTINUED | OUTPATIENT
Start: 2023-02-06 | End: 2023-02-07 | Stop reason: HOSPADM

## 2023-02-06 RX ORDER — ONDANSETRON 2 MG/ML
4 INJECTION INTRAMUSCULAR; INTRAVENOUS EVERY 6 HOURS PRN
Status: DISCONTINUED | OUTPATIENT
Start: 2023-02-06 | End: 2023-02-07 | Stop reason: HOSPADM

## 2023-02-06 RX ORDER — ONDANSETRON 2 MG/ML
4 INJECTION INTRAMUSCULAR; INTRAVENOUS ONCE AS NEEDED
Status: DISCONTINUED | OUTPATIENT
Start: 2023-02-06 | End: 2023-02-07 | Stop reason: HOSPADM

## 2023-02-06 RX ORDER — MAGNESIUM CARB/ALUMINUM HYDROX 105-160MG
30 TABLET,CHEWABLE ORAL ONCE AS NEEDED
Status: DISCONTINUED | OUTPATIENT
Start: 2023-02-06 | End: 2023-02-07 | Stop reason: HOSPADM

## 2023-02-06 RX ORDER — LIDOCAINE HYDROCHLORIDE 10 MG/ML
5 INJECTION, SOLUTION EPIDURAL; INFILTRATION; INTRACAUDAL; PERINEURAL AS NEEDED
Status: DISCONTINUED | OUTPATIENT
Start: 2023-02-06 | End: 2023-02-07 | Stop reason: HOSPADM

## 2023-02-06 RX ORDER — EPHEDRINE SULFATE 50 MG/ML
5 INJECTION, SOLUTION INTRAVENOUS AS NEEDED
Status: DISCONTINUED | OUTPATIENT
Start: 2023-02-06 | End: 2023-02-07 | Stop reason: HOSPADM

## 2023-02-06 RX ORDER — FAMOTIDINE 10 MG/ML
20 INJECTION, SOLUTION INTRAVENOUS ONCE AS NEEDED
Status: DISCONTINUED | OUTPATIENT
Start: 2023-02-06 | End: 2023-02-07 | Stop reason: HOSPADM

## 2023-02-06 RX ADMIN — SODIUM CHLORIDE, POTASSIUM CHLORIDE, SODIUM LACTATE AND CALCIUM CHLORIDE 125 ML/HR: 600; 310; 30; 20 INJECTION, SOLUTION INTRAVENOUS at 19:23

## 2023-02-06 RX ADMIN — SODIUM CHLORIDE, POTASSIUM CHLORIDE, SODIUM LACTATE AND CALCIUM CHLORIDE 125 ML/HR: 600; 310; 30; 20 INJECTION, SOLUTION INTRAVENOUS at 11:54

## 2023-02-06 RX ADMIN — DINOPROSTONE 10 MG: 10 INSERT VAGINAL at 13:24

## 2023-02-06 NOTE — H&P
[facility]  Estrellita Bobby  : 1995  MRN: [id number]  CSN: [id number]    History and Physical    Subjective   Chief Complaint   Patient presents with   • Scheduled Induction     Patient presents to labor and delivery for a scheduled induction of labor     Estrellita Bobby is a 27 y.o. year old  with an Estimated Date of Delivery: 23 currently at 39w0d presenting with concern for possible small fetal coarctation of aorta.  For this reason, delivery has been recommended here as opposed to [facility] with plan to for baby to get an echo within 24 hours of delivery.  Patient feels well today. She is with  and I spoke with them with assistance of  via iPad.      Prenatal care has been with Dr. Ceron at [facility].  It has been complicated by anemia, fetal issues (small HC, short femur, possible coarctation of aorta).    OB History    Para Term  AB Living   1 0 0 0 0 0   SAB IAB Ectopic Molar Multiple Live Births   0 0 0 0 0 0      # Outcome Date GA Lbr Tee/2nd Weight Sex Delivery Anes PTL Lv   1 Current              No past medical history on file.  No past surgical history on file.    Current Facility-Administered Medications:   •  acetaminophen (TYLENOL) tablet 650 mg, 650 mg, Oral, Q4H PRN, Lida Musa MD  •  butorphanol (STADOL) injection 2 mg, 2 mg, Intravenous, Q3H PRN, Lida Musa MD  •  dinoprostone (CERVIDIL) vaginal insert 10 mg, 10 mg, Vaginal, Once, Lida Musa MD  •  diphenhydrAMINE (BENADRYL) injection 12.5 mg, 12.5 mg, Intravenous, Q8H PRN, Barry Ornelas MD  •  ePHEDrine injection 5 mg, 5 mg, Intravenous, PRN, Barry Ornelas MD  •  famotidine (PEPCID) injection 20 mg, 20 mg, Intravenous, BID PRN **OR** famotidine (PEPCID) tablet 20 mg, 20 mg, Oral, BID PRN, Lida Musa MD  •  famotidine (PEPCID) injection 20 mg, 20 mg, Intravenous, Once PRN, Barry Ornelas MD  •  fentaNYL 2mcg/mL and  ropivacaine 0.2% in NS epidural 100mL, 10 mL/hr, Epidural, Continuous, Barry Ornelas MD  •  lactated ringers infusion, 125 mL/hr, Intravenous, Continuous, Lida Musa MD, Last Rate: 125 mL/hr at 02/06/23 1154, 125 mL/hr at 02/06/23 1154  •  lidocaine PF 1% (XYLOCAINE) injection 5 mL, 5 mL, Intradermal, PRN, Lida Musa MD  •  mineral oil liquid 30 mL, 30 mL, Topical, Once PRN, Lida Musa MD  •  ondansetron (ZOFRAN) tablet 4 mg, 4 mg, Oral, Q6H PRN **OR** ondansetron (ZOFRAN) injection 4 mg, 4 mg, Intravenous, Q6H PRN, Lida Musa MD  •  ondansetron (ZOFRAN) injection 4 mg, 4 mg, Intravenous, Once PRN, Barry Ornelas MD  •  sodium chloride 0.9 % flush 10 mL, 10 mL, Intravenous, Q12H, Lida Musa MD  •  sodium chloride 0.9 % flush 10 mL, 10 mL, Intravenous, PRN, Lida Musa MD  •  terbutaline (BRETHINE) injection 0.25 mg, 0.25 mg, Subcutaneous, PRN, Lida Musa MD    No Known Allergies  Social History    Tobacco Use      Smoking status: Never      Smokeless tobacco: Never    Review of Systems      Objective   Breastfeeding No   General: well developed; well nourished  no acute distress   Abdomen: soft, non-tender; no masses  no umbilical or inguinal hernias are present  no hepato-splenomegaly   FHT's: reassuring, reactive and category 1      Cervix: was checked (by RN): 0 cm / 1 % / -3   Presentation: cephalic   Contractions: none   Back: CVA tenderness is absent     Prenatal Labs  Lab Results   Component Value Date    HGB 12.1 02/06/2023    RUBELLAABIGG 2.72 12/06/2022    HEPBSAG Negative 12/06/2022    ABSCRN Negative 12/06/2022    HUO7CIM5 Non Reactive 12/06/2022    HEPCVIRUSABY <0.1 12/06/2022    STREPGPB Negative 01/23/2023    URINECX Final report 12/06/2022    CHLAMNAA Negative 12/06/2022    NGONORRHON Negative 12/06/2022       Current Labs Reviewed   CBC:   Lab Results   Component Value Date    WBC 6.56 02/06/2023    HGB 12.1 02/06/2023     HCT 38.1 02/06/2023     02/06/2023     CMP:   Lab Results   Component Value Date     02/06/2023    K 3.7 02/06/2023     02/06/2023    CO2 19.8 (L) 02/06/2023    BUN 8 02/06/2023    CREATININE 0.52 (L) 02/06/2023    GLUCOSE 85 02/06/2023    ALBUMIN 3.4 (L) 02/06/2023    CALCIUM 8.4 (L) 02/06/2023    AST 21 02/06/2023    ALT 13 02/06/2023    BILITOT <0.2 02/06/2023          Assessment   1. IUP at 39w0d  2. Suspected mild fetal coarctation of aorta     Plan   1. Start IOL with cervidil as patient's cervix is closed  2. NICU consult placed at patient request so they can discuss management plan once baby born    Lida Musa MD  2/6/2023  12:35 EST

## 2023-02-06 NOTE — PLAN OF CARE
Problem: Adult Inpatient Plan of Care  Goal: Plan of Care Review  Outcome: Ongoing, Progressing  Flowsheets (Taken 2/6/2023 1814)  Progress: improving  Plan of Care Reviewed With: patient  Outcome Evaluation: IOL cervadil in place, patient roly every 2 minutes mild by palpation. category 1 fetal tracing  Goal: Patient-Specific Goal (Individualized)  Outcome: Ongoing, Progressing  Goal: Absence of Hospital-Acquired Illness or Injury  Outcome: Ongoing, Progressing  Goal: Optimal Comfort and Wellbeing  Outcome: Ongoing, Progressing  Intervention: Provide Person-Centered Care  Recent Flowsheet Documentation  Taken 2/6/2023 1300 by Rita Canales, RN  Trust Relationship/Rapport: care explained  Goal: Readiness for Transition of Care  Outcome: Ongoing, Progressing  Intervention: Mutually Develop Transition Plan  Recent Flowsheet Documentation  Taken 2/6/2023 1159 by Rita Canales, RN  Equipment Currently Used at Home: none     Problem: Bleeding (Labor)  Goal: Hemostasis  Outcome: Ongoing, Progressing     Problem: Change in Fetal Wellbeing (Labor)  Goal: Stable Fetal Wellbeing  Outcome: Ongoing, Progressing     Problem: Delayed Labor Progression (Labor)  Goal: Effective Progression to Delivery  Outcome: Ongoing, Progressing     Problem: Infection (Labor)  Goal: Absence of Infection Signs and Symptoms  Outcome: Ongoing, Progressing     Problem: Labor Pain (Labor)  Goal: Acceptable Pain Control  Outcome: Ongoing, Progressing     Problem: Uterine Tachysystole (Labor)  Goal: Normal Uterine Contraction Pattern  Outcome: Ongoing, Progressing   Goal Outcome Evaluation:  Plan of Care Reviewed With: patient        Progress: improving  Outcome Evaluation: IOL cervadil in place, patient roly every 2 minutes mild by palpation. category 1 fetal tracing

## 2023-02-06 NOTE — TELEPHONE ENCOUNTER
Spoke with the pts  using an .  They did not show up at [facility] this morning.  According to  they had a flat tire.  They will be on their way now.  L&D notified.

## 2023-02-07 PROCEDURE — 0KQM0ZZ REPAIR PERINEUM MUSCLE, OPEN APPROACH: ICD-10-PCS | Performed by: OBSTETRICS & GYNECOLOGY

## 2023-02-07 PROCEDURE — 88307 TISSUE EXAM BY PATHOLOGIST: CPT

## 2023-02-07 PROCEDURE — 25010000002 BUTORPHANOL PER 1 MG: Performed by: OBSTETRICS & GYNECOLOGY

## 2023-02-07 RX ORDER — IBUPROFEN 600 MG/1
600 TABLET ORAL EVERY 6 HOURS PRN
Status: DISCONTINUED | OUTPATIENT
Start: 2023-02-07 | End: 2023-02-09 | Stop reason: HOSPADM

## 2023-02-07 RX ORDER — DIPHENHYDRAMINE HCL 25 MG
25 CAPSULE ORAL NIGHTLY PRN
Status: DISCONTINUED | OUTPATIENT
Start: 2023-02-07 | End: 2023-02-09 | Stop reason: HOSPADM

## 2023-02-07 RX ORDER — OXYTOCIN/0.9 % SODIUM CHLORIDE 30/500 ML
125 PLASTIC BAG, INJECTION (ML) INTRAVENOUS CONTINUOUS PRN
Status: COMPLETED | OUTPATIENT
Start: 2023-02-07 | End: 2023-02-07

## 2023-02-07 RX ORDER — ACETAMINOPHEN 325 MG/1
650 TABLET ORAL EVERY 6 HOURS PRN
Status: DISCONTINUED | OUTPATIENT
Start: 2023-02-07 | End: 2023-02-09 | Stop reason: HOSPADM

## 2023-02-07 RX ORDER — HYDROCORTISONE 25 MG/G
1 CREAM TOPICAL AS NEEDED
Status: DISCONTINUED | OUTPATIENT
Start: 2023-02-07 | End: 2023-02-09 | Stop reason: HOSPADM

## 2023-02-07 RX ORDER — OXYTOCIN/0.9 % SODIUM CHLORIDE 30/500 ML
999 PLASTIC BAG, INJECTION (ML) INTRAVENOUS ONCE
Status: COMPLETED | OUTPATIENT
Start: 2023-02-07 | End: 2023-02-07

## 2023-02-07 RX ORDER — BISACODYL 10 MG
10 SUPPOSITORY, RECTAL RECTAL DAILY PRN
Status: DISCONTINUED | OUTPATIENT
Start: 2023-02-08 | End: 2023-02-09 | Stop reason: HOSPADM

## 2023-02-07 RX ORDER — BUTORPHANOL TARTRATE 1 MG/ML
2 INJECTION, SOLUTION INTRAMUSCULAR; INTRAVENOUS
Status: DISCONTINUED | OUTPATIENT
Start: 2023-02-07 | End: 2023-02-08

## 2023-02-07 RX ORDER — SODIUM CHLORIDE 0.9 % (FLUSH) 0.9 %
1-10 SYRINGE (ML) INJECTION AS NEEDED
Status: DISCONTINUED | OUTPATIENT
Start: 2023-02-07 | End: 2023-02-09 | Stop reason: HOSPADM

## 2023-02-07 RX ORDER — DOCUSATE SODIUM 100 MG/1
100 CAPSULE, LIQUID FILLED ORAL 2 TIMES DAILY
Status: DISCONTINUED | OUTPATIENT
Start: 2023-02-08 | End: 2023-02-09 | Stop reason: HOSPADM

## 2023-02-07 RX ORDER — TRANEXAMIC ACID 10 MG/ML
1000 INJECTION, SOLUTION INTRAVENOUS ONCE AS NEEDED
Status: DISCONTINUED | OUTPATIENT
Start: 2023-02-07 | End: 2023-02-09 | Stop reason: HOSPADM

## 2023-02-07 RX ORDER — CARBOPROST TROMETHAMINE 250 UG/ML
250 INJECTION, SOLUTION INTRAMUSCULAR
Status: DISCONTINUED | OUTPATIENT
Start: 2023-02-07 | End: 2023-02-07 | Stop reason: HOSPADM

## 2023-02-07 RX ORDER — HYDROCODONE BITARTRATE AND ACETAMINOPHEN 5; 325 MG/1; MG/1
1 TABLET ORAL EVERY 4 HOURS PRN
Status: DISCONTINUED | OUTPATIENT
Start: 2023-02-07 | End: 2023-02-09 | Stop reason: HOSPADM

## 2023-02-07 RX ORDER — OXYTOCIN/0.9 % SODIUM CHLORIDE 30/500 ML
250 PLASTIC BAG, INJECTION (ML) INTRAVENOUS CONTINUOUS
Status: DISPENSED | OUTPATIENT
Start: 2023-02-07 | End: 2023-02-07

## 2023-02-07 RX ORDER — HYDROCODONE BITARTRATE AND ACETAMINOPHEN 10; 325 MG/1; MG/1
1 TABLET ORAL EVERY 4 HOURS PRN
Status: DISCONTINUED | OUTPATIENT
Start: 2023-02-07 | End: 2023-02-09 | Stop reason: HOSPADM

## 2023-02-07 RX ORDER — METHYLERGONOVINE MALEATE 0.2 MG/ML
200 INJECTION INTRAVENOUS ONCE AS NEEDED
Status: DISCONTINUED | OUTPATIENT
Start: 2023-02-07 | End: 2023-02-07 | Stop reason: HOSPADM

## 2023-02-07 RX ORDER — MISOPROSTOL 100 MCG
25 TABLET ORAL
Status: DISCONTINUED | OUTPATIENT
Start: 2023-02-07 | End: 2023-02-09

## 2023-02-07 RX ORDER — ERYTHROMYCIN 5 MG/G
OINTMENT OPHTHALMIC
Status: ACTIVE
Start: 2023-02-07 | End: 2023-02-08

## 2023-02-07 RX ORDER — MISOPROSTOL 200 UG/1
800 TABLET ORAL ONCE AS NEEDED
Status: DISCONTINUED | OUTPATIENT
Start: 2023-02-07 | End: 2023-02-07 | Stop reason: HOSPADM

## 2023-02-07 RX ORDER — PHYTONADIONE 1 MG/.5ML
INJECTION, EMULSION INTRAMUSCULAR; INTRAVENOUS; SUBCUTANEOUS
Status: DISCONTINUED
Start: 2023-02-07 | End: 2023-02-08

## 2023-02-07 RX ORDER — OXYTOCIN/0.9 % SODIUM CHLORIDE 30/500 ML
2-20 PLASTIC BAG, INJECTION (ML) INTRAVENOUS
Status: DISCONTINUED | OUTPATIENT
Start: 2023-02-07 | End: 2023-02-09 | Stop reason: HOSPADM

## 2023-02-07 RX ADMIN — BUTORPHANOL TARTRATE 2 MG: 1 INJECTION, SOLUTION INTRAMUSCULAR; INTRAVENOUS at 08:50

## 2023-02-07 RX ADMIN — BUTORPHANOL TARTRATE 2 MG: 1 INJECTION, SOLUTION INTRAMUSCULAR; INTRAVENOUS at 05:18

## 2023-02-07 RX ADMIN — SODIUM CHLORIDE, POTASSIUM CHLORIDE, SODIUM LACTATE AND CALCIUM CHLORIDE 125 ML/HR: 600; 310; 30; 20 INJECTION, SOLUTION INTRAVENOUS at 19:06

## 2023-02-07 RX ADMIN — SODIUM CHLORIDE, POTASSIUM CHLORIDE, SODIUM LACTATE AND CALCIUM CHLORIDE 125 ML/HR: 600; 310; 30; 20 INJECTION, SOLUTION INTRAVENOUS at 03:03

## 2023-02-07 RX ADMIN — Medication 999 ML/HR: at 21:49

## 2023-02-07 RX ADMIN — Medication 125 ML/HR: at 22:35

## 2023-02-07 RX ADMIN — SODIUM CHLORIDE, POTASSIUM CHLORIDE, SODIUM LACTATE AND CALCIUM CHLORIDE 125 ML/HR: 600; 310; 30; 20 INJECTION, SOLUTION INTRAVENOUS at 11:19

## 2023-02-07 RX ADMIN — Medication 2 MILLI-UNITS/MIN: at 19:45

## 2023-02-07 NOTE — PROGRESS NOTES
Pt sleeping between contractions, still declining epidural , FHTs 145 with 10-15 BTB variability, and no decedl with contractions. sac

## 2023-02-07 NOTE — NURSING NOTE
Phoned Dr Musa to report patient is rating pain 8/10 and having strong contractions.  Orders received.

## 2023-02-07 NOTE — PLAN OF CARE
Problem: Adult Inpatient Plan of Care  Goal: Plan of Care Review  Outcome: Ongoing, Progressing  Flowsheets (Taken 2/7/2023 0603 by Jennifer Moore, RN)  Outcome Evaluation: IOL - cervidil.  VSS.  Goal: Patient-Specific Goal (Individualized)  Outcome: Ongoing, Progressing  Goal: Absence of Hospital-Acquired Illness or Injury  Outcome: Ongoing, Progressing  Goal: Optimal Comfort and Wellbeing  Outcome: Ongoing, Progressing  Goal: Readiness for Transition of Care  Outcome: Ongoing, Progressing     Problem: Bleeding (Labor)  Goal: Hemostasis  Outcome: Ongoing, Progressing     Problem: Change in Fetal Wellbeing (Labor)  Goal: Stable Fetal Wellbeing  Outcome: Ongoing, Progressing     Problem: Delayed Labor Progression (Labor)  Goal: Effective Progression to Delivery  Outcome: Ongoing, Progressing     Problem: Infection (Labor)  Goal: Absence of Infection Signs and Symptoms  Outcome: Ongoing, Progressing     Problem: Labor Pain (Labor)  Goal: Acceptable Pain Control  Outcome: Ongoing, Progressing     Problem: Uterine Tachysystole (Labor)  Goal: Normal Uterine Contraction Pattern  Outcome: Ongoing, Progressing   Goal Outcome Evaluation:  Plan of Care Reviewed With: patient        Progress: improving  Outcome Evaluation: IOL cervadil in place, patient roly every 2 minutes mild by palpation. category 1 fetal tracing

## 2023-02-07 NOTE — PLAN OF CARE
Goal Outcome Evaluation:  Plan of Care Reviewed With: patient, significant other        Progress: improving  Outcome Evaluation: IOL - cervidil.  VSS.

## 2023-02-07 NOTE — PROGRESS NOTES
Pt 8 cms/-2 station and wants to continue going natural with out epidural or pitocin.  with moderate variability , contractions about every 5 mins. sac

## 2023-02-08 LAB
BASOPHILS # BLD AUTO: 0.02 10*3/MM3 (ref 0–0.2)
BASOPHILS NFR BLD AUTO: 0.1 % (ref 0–1.5)
DEPRECATED RDW RBC AUTO: 54.6 FL (ref 37–54)
EOSINOPHIL # BLD AUTO: 0.01 10*3/MM3 (ref 0–0.4)
EOSINOPHIL NFR BLD AUTO: 0.1 % (ref 0.3–6.2)
ERYTHROCYTE [DISTWIDTH] IN BLOOD BY AUTOMATED COUNT: 17.4 % (ref 12.3–15.4)
HCT VFR BLD AUTO: 27.4 % (ref 34–46.6)
HGB BLD-MCNC: 9.2 G/DL (ref 12–15.9)
IMM GRANULOCYTES # BLD AUTO: 0.08 10*3/MM3 (ref 0–0.05)
IMM GRANULOCYTES NFR BLD AUTO: 0.5 % (ref 0–0.5)
LYMPHOCYTES # BLD AUTO: 2.07 10*3/MM3 (ref 0.7–3.1)
LYMPHOCYTES NFR BLD AUTO: 13.3 % (ref 19.6–45.3)
MCH RBC QN AUTO: 29.2 PG (ref 26.6–33)
MCHC RBC AUTO-ENTMCNC: 33.6 G/DL (ref 31.5–35.7)
MCV RBC AUTO: 87 FL (ref 79–97)
MONOCYTES # BLD AUTO: 1.04 10*3/MM3 (ref 0.1–0.9)
MONOCYTES NFR BLD AUTO: 6.7 % (ref 5–12)
NEUTROPHILS NFR BLD AUTO: 12.4 10*3/MM3 (ref 1.7–7)
NEUTROPHILS NFR BLD AUTO: 79.3 % (ref 42.7–76)
NRBC BLD AUTO-RTO: 0 /100 WBC (ref 0–0.2)
PLATELET # BLD AUTO: 170 10*3/MM3 (ref 140–450)
PMV BLD AUTO: 10.8 FL (ref 6–12)
RBC # BLD AUTO: 3.15 10*6/MM3 (ref 3.77–5.28)
WBC NRBC COR # BLD: 15.62 10*3/MM3 (ref 3.4–10.8)

## 2023-02-08 PROCEDURE — 85025 COMPLETE CBC W/AUTO DIFF WBC: CPT | Performed by: OBSTETRICS & GYNECOLOGY

## 2023-02-08 RX ADMIN — DOCUSATE SODIUM 100 MG: 100 CAPSULE, LIQUID FILLED ORAL at 01:42

## 2023-02-08 RX ADMIN — DOCUSATE SODIUM 100 MG: 100 CAPSULE, LIQUID FILLED ORAL at 08:40

## 2023-02-08 RX ADMIN — Medication 1 APPLICATION: at 01:43

## 2023-02-08 RX ADMIN — IBUPROFEN 600 MG: 600 TABLET, FILM COATED ORAL at 18:32

## 2023-02-08 RX ADMIN — DOCUSATE SODIUM 100 MG: 100 CAPSULE, LIQUID FILLED ORAL at 21:19

## 2023-02-08 NOTE — PROCEDURES
Vaginal Delivery Note    Delivery details     Delivery:      YOB: 2024    Time of Birth: 9:43 PM      Anesthesia: None     Delivering clinician:     Forceps?   No   Vacuum? No    Shoulder dystocia present: No      Delivery narrative:  Delivered viable female direct OP over second degree tear and slipped on mothers abdomen.Partner cut cord post 30 sec wait with instruction.Placenta delivered spontaneous and intact. Repair with 3 0 Monocryl with figure 8 over ectal capsule under lidocaine. Pt tolerated well, counts correct x 2. Osvaldo her for delivery    Infant details    Findings: female  infant     Infant observations: Weight: 2951 g (6 lb 8.1 oz)   Length: 20.5  in   Apgars: 9  @ 1 minute /    9  @ 5 minutes     Placenta, Cord, and Fluid    Placenta delivered    at        Cord:   present.   Nuchal Cord?  no   Cord blood obtained:       Repair    Episiotomy: Not recorded    Estimated Blood Loss:         Complications  none    Disposition  Mother to Mother Baby/Postpartum  in stable condition currently.  Baby to NBN  in stable condition currently.      Mirtha Espana MD  02/07/23  22:26 EST  
Continue DASH diet  Continue Lipitor
Continue DASH diet  Continue Lipitor

## 2023-02-08 NOTE — PLAN OF CARE
Goal Outcome Evaluation:      Progressing well,  ambulatory, self care, pain controlled, voids without diff.  Has visited baby in NICU.  Plans to breast and bottle feed

## 2023-02-08 NOTE — CONSULTS
PRENATAL CONSULTATION NOTE     DATE: 2023  MRN: [id number]      Patient Name: Estrellita Bobby  YOB: 1995      Requesting Physician:  Lida Musa    EDC: 2023, by Ultrasound    GA: 39w1d    Estimated fetal weight: 2776 g    Reason for Consultation: expected congenital birth defect  suspected mild fetal coarctation    Maternal History: 28 y.o.  with anemia, fetal issues (small HC, short femur, possible coarctation of aorta).      Medications:  No recently discontinued medications to reconcile    Consult:  father and mother available for discussion.  We reviewed the fetal and  aspects of the above conditions to include: respiratory distress syndrome and congenital heart disease    Plan for Resuscitation: full resuscitation    Offered estimation of prognosis of potential length of infant hospitalization.     We will plan to attend delivery when requested. I discussed the importance of providing human milk for all infants, especially premature infants. The patient does plan on providing pumped breast milk and/or nursing when appropriate.    I also reviewed policies and procedures for NICU/ICN admission and reviewed structure and function of [facility] - Neonatology at [facility].    Thank you for allowing us to participate in the care of this patient. [facility] is always available for follow-up consultation as indicated. Please do not hesitate to call for additional questions or issues.    Additional Comments: Mother wishes to breast and bottle feed. Parents aware infant will be admitted to NICU and will remain in NICU until Echo obtained and resulted.    STANLEY Pendleton   Nurse Practitioner  [facility] - Neonatology  [facility]  23 @ 20:26 EST    Consult note reviewed and electronically signed on 2023 at 20:26 EST    INITIAL HOSPITAL CONSULT:  A total of 40 minutes were spent on counseling and coordination of care  including discussion with physicians and nursing, and reviewing the findings and recommendations with the patient and her family of which 30 minutes were spent face-to-face with the patient during this encounter.    Thank you for requesting this consult. Please contact the [facility] on-call  Provider for any further questions/concerns, by calling the NICU at extension [phone].        DISCLAIMER:       “As of 2021, as required by the Federal 21st Century Cures Act, medical records (including provider notes and laboratory/imaging results) are to be made available to patients and/or their designees as soon as the documents are signed/resulted. While the intention is to ensure transparency and to engage patients in their healthcare, this immediate access may create unintended consequences because this document uses language intended for communication between medical providers for interpretation with the entirety of the patient’s clinical picture in mind. It is recommended that patients and/or their designees review all available information with their primary or specialist providers for explanation and to avoid misinterpretation of this information.”

## 2023-02-08 NOTE — PROGRESS NOTES
Donn Bobby  : 1995  MRN: [id number]  CSN: [id number]    Hospital Day: 3    Postpartum Day #1  Subjective     CC: hospital follow-up    Her pain is well controlled. Vaginal bleeding is normal in amount. She is ambulating without difficulty. She is voiding without difficulty.  The patient complains of a sensation that something is falling out of her vagina where her sutures are located.    A  was utilized for this interview.     Objective     Min/max vitals past 24 hours:   Temp  Min: 98.1 °F (36.7 °C)  Max: 99.7 °F (37.6 °C)  BP  Min: 107/69  Max: 148/80  Pulse  Min: 67  Max: 116  Resp  Min: 16  Max: 24        General: well developed; well nourished  no acute distress   Abdomen: soft, non-tender; no masses, fundus firm   Pelvic: Not performed   Ext: Calves NT     Lab Results   Component Value Date    WBC 15.62 (H) 2023    HGB 9.2 (L) 2023    HCT 27.4 (L) 2023    MCV 87.0 2023     2023    RH Positive 2023    HEPBSAG Negative 2022        Assessment   1. Postpartum Day #1 S/P vaginal delivery  2. Suspected coarctation of aorta in      Plan   1. Continue routine postpartum care.  Unremarkable vaginal exam.    Sadia Bustillo MD  2023  10:57 EST

## 2023-02-08 NOTE — CONSULTS
PRENATAL CONSULTATION NOTE     DATE: 2/3/23  MRN: [id number]      Patient Name: Estrellita Bobby  YOB: 1995      Requesting Physician:  Yadi Albarran    EDC: 2023, by Ultrasound    GA: 39w1d    Estimated fetal weight: 2776 g (12% AC 17%)     Reason for Consultation: expected congenital birth defect  Mild coarctation of the aorta on Prenatal US    Maternal History: 28 y.o.  with Fetal anomaly: Mild coarctation of the aorta, Suspected microcephaly (not found), Short femur  Aneuploidy Screening: low risk cell free DNA      Medications:  No recently discontinued medications to reconcile    Consult:  father and mother available for discussion.  We reviewed the fetal and  aspects of the above conditions to include: estimated intact survival rate    Plan for Resuscitation: full resuscitation        Offered estimation of prognosis of potential length of infant hospitalization.     We will plan to attend delivery when requested. I discussed the importance of providing human milk for all infants, especially premature infants. The patient does plan on providing pumped breast milk and/or nursing when appropriate.    I also reviewed policies and procedures for NICU/ICN admission and reviewed structure and function of [facility] - Neonatology at [facility].    Thank you for allowing us to participate in the care of this patient. [facility] is always available for follow-up consultation as indicated. Please do not hesitate to call for additional questions or issues.    Additional Comments: Echo to be done soon after birth. Both parents understand that there is a possibility baby would need to be transferred to [facility] if coarctation is significant    Allen Braun MD  Attending Neonatologist  [facility] - Neonatology  [facility]  23 @ 15:17 EST    Consult note reviewed and electronically signed on 2023 at 15:17 EST    INITIAL  HOSPITAL CONSULT:  A total of 30 minutes were spent on counseling and coordination of care including discussion with physicians and nursing, and reviewing the findings and recommendations with the patient and her family of which 20 minutes were spent face-to-face with the patient during this encounter.    Thank you for requesting this consult. Please contact the [facility] on-call  Provider for any further questions/concerns, by calling the NICU at extension [phone].        DISCLAIMER:       “As of 2021, as required by the Federal 21st Century Cures Act, medical records (including provider notes and laboratory/imaging results) are to be made available to patients and/or their designees as soon as the documents are signed/resulted. While the intention is to ensure transparency and to engage patients in their healthcare, this immediate access may create unintended consequences because this document uses language intended for communication between medical providers for interpretation with the entirety of the patient’s clinical picture in mind. It is recommended that patients and/or their designees review all available information with their primary or specialist providers for explanation and to avoid misinterpretation of this information.”

## 2023-02-08 NOTE — PLAN OF CARE
Goal Outcome Evaluation:           Progress: improving  Outcome Evaluation: Pt doing well. VSS.  Pain controlled with Motrin. HGP at bedside and mother visiting infant in NICU. Addressed patients concerns about vaginal pain; educated WNL for vaginal delivery.  used for education.  No concerns at this time, will continue to monitor.

## 2023-02-09 VITALS
SYSTOLIC BLOOD PRESSURE: 130 MMHG | OXYGEN SATURATION: 100 % | DIASTOLIC BLOOD PRESSURE: 78 MMHG | RESPIRATION RATE: 16 BRPM | HEART RATE: 99 BPM | TEMPERATURE: 98.7 F

## 2023-02-09 RX ORDER — IBUPROFEN 600 MG/1
600 TABLET ORAL EVERY 6 HOURS PRN
Qty: 3 TABLET | Refills: 0 | Status: SHIPPED | OUTPATIENT
Start: 2023-02-09

## 2023-02-09 RX ORDER — ACETAMINOPHEN 325 MG/1
650 TABLET ORAL EVERY 6 HOURS PRN
Qty: 30 TABLET | Refills: 0 | Status: SHIPPED | OUTPATIENT
Start: 2023-02-09

## 2023-02-09 RX ADMIN — DOCUSATE SODIUM 100 MG: 100 CAPSULE, LIQUID FILLED ORAL at 08:45

## 2023-02-09 NOTE — PROGRESS NOTES
Donn Bobby  : 1995  MRN: [id number]  CSN: [id number]    Hospital Day: 4    Postpartum Day #2  Subjective     CC: hospital follow-up    Her pain is well controlled.  Vaginal bleeding is less than a normal period. Information obtained through use of  iPad. Her  is doing well from a cardiac standpoint, but continues to have an NG tube.       Objective     Min/max vitals past 24 hours:   Temp  Min: 98.7 °F (37.1 °C)  Max: 98.7 °F (37.1 °C)  BP  Min: 108/68  Max: 130/78  Pulse  Min: 88  Max: 99  Resp  Min: 16  Max: 16        General: well developed; well nourished  no acute distress   Abdomen: fundus firm and non-tender   Pelvic: Not performed   Ext: Calves NT     Results from last 7 days   Lab Units 23  0623 23  1153   WBC 10*3/mm3 15.62* 6.56   HEMOGLOBIN g/dL 9.2* 12.1   HEMATOCRIT % 27.4* 38.1   PLATELETS 10*3/mm3 170 234     Lab Results   Component Value Date    RH Positive 2023    HEPBSAG Negative 2022        Assessment   1. Postpartum Day #2 S/P vaginal delivery  2. Doing well     Plan   1. Discharge to home  2. Advised no tampons or intercourse for 6 weeks.  3. D/C questions all answered  4. Follow-up appointment in 2 week(s)    Sulaiman Quintero MD  2023  16:18 EST

## 2023-02-09 NOTE — PLAN OF CARE
Goal Outcome Evaluation:  Plan of Care Reviewed With: patient           Outcome Evaluation: VSS. Pt took a shower this evening. Pt taking Motrin PRN for pain. Up ad daina. Using breastpump and taking colostrum to NICU for the baby. Will continue to monitor.

## 2023-02-09 NOTE — PROGRESS NOTES
Discharge Planning Assessment  [facility]     Patient Name: Estrellita Bobby  MRN: [id number]  Today's Date: 2/9/2023    Admit Date: 2/6/2023    Plan: Infant may discharge to mother when medically ready. CSW will follow cord toxicology. Juana LOPEZ JOSR   Discharge Needs Assessment    No documentation.                Discharge Plan     Row Name 02/09/23 1405       Plan    Plan Infant may discharge to mother when medically ready. CSW will follow cord toxicology. Juana TRAEJOSR    Plan Comments (continued) Mother is current with WIC, and CSW explained they would need to call to have infant added to mother’s plan following infant’s discharge. CSW discussed the HANDS program, and father voiced they are already enrolled in the program. CSW also discussed Motherhood Connection, and parents voiced interest in the program; CSW contacted HAI Krissy to provide mother and infant’s information and she stated she would come to see mother prior to her discharge today. CSW also provided information about [facility] as a boarding option while infant is in the NICU. CSW provided a packet of resources including: WIC, HANDS, transportation, infant supplies, counseling, online support groups, postpartum mood and anxiety resources, NICU parent resources, [facility], and general community resources. Mother and father were polite and appropriate throughout assessment, and denied having unmet needs at this time. Father voiced gratitude for CSW’s help and resources. CSW will follow cord toxicology, and complete mandated reporting to CPS if warranted. CSW will remain available for psychosocial needs while infant is in the NICU. Juana AJOSR    Row Name 02/09/23 1404       Plan    Plan Comments Mother: Estrellita Bobby, MRN [id number]. Infant: ObedaApple “Raven” Kanu, MRN [id number]. CSW was consulted for “Nicu admission, non english speaking, parents timid to interact with infant”; mother also  had late prenatal care beginning around 30 weeks. Of note, mother’s UDS was negative prenatally on 12/6 and did not have a UDS collected on admit for delivery; infant’s UDS was missed, and cord toxicology has been sent. CSW met with mother and father of infant/significant other at bedside; CSW used a  thao for some of the assessment, but father was able to translate the majority of the time. Mother verified address, phone number, and insurance. Parents confirmed a [employer] rep met with mother to have infant added to insurance, but were unsure of mother’s insurance status. Per chart review, [employer] was able to open a Medicaid case for mother and it is pending at this time. Parents report having a car seat, crib, clothes, and diapers for infant. This is mother and father’s 1st child. Father shared mother lives with her sisters and father, but they all work. Father lives in [place], and voiced he is mother’s primary support at this time. Parents had not decided on a pediatrician as of yet, and CSW provided a few options closer to where they live. Father voiced they are comfortable scheduling appointments for infant and reports they have reliable transportation at this time. (continued)              Continued Care and Services - Admitted Since 2/6/2023    Coordination has not been started for this encounter.          Demographic Summary     Row Name 02/09/23 1409       General Information    Admission Type inpatient    Arrived From home    Referral Source nursing    Reason for Consult psychosocial concerns    General Information Comments late PNC, NICU admit, resources               Functional Status     Row Name 02/09/23 1405       Functional Status, IADL    Medications independent    Meal Preparation independent    Housekeeping independent    Laundry independent    Shopping independent       Mental Status    General Appearance WDL WDL       Mental Status Summary    Recent Changes in Mental  Status/Cognitive Functioning no changes       Employment/    Employment Status unemployed               Psychosocial     Row Name 02/09/23 1403       Behavior WDL    Behavior WDL WDL       Emotion Mood WDL    Emotion/Mood/Affect WDL WDL       Speech WDL    Speech WDL WDL       Perceptual State WDL    Perceptual State WDL WDL       Thought Process WDL    Thought Process WDL WDL       Intellectual Performance WDL    Intellectual Performance WDL WDL       Coping/Stress    Major Change/Loss/Stressor birth;medical condition/diagnosis    Patient Personal Strengths able to adapt;flexibility;positive attitude;resilient;future/goal oriented    Sources of Support significant other;parent(s);sibling(s)               Abuse/Neglect     Row Name 02/09/23 1404       Personal Safety    Physical Signs of Abuse Present no               Legal    No documentation.                Substance Abuse    No documentation.                Patient Forms    No documentation.                   SHERMAN Ely

## 2023-02-09 NOTE — LACTATION NOTE
This note was copied from a baby's chart.  Mother's RN asked LC to assess mom's nipples during pumping to determine if she is using the correct size flanges. Mom is currently pumping with 24 mm, but they are too small for her nipples. 27mm recommended and after trying them , mom said they feel better.

## 2023-02-09 NOTE — DISCHARGE SUMMARY
Discharge Summary    [facility]   Estrellita Bobby  : 1995  MRN: [id number]  [facility]: [id number]    Date of Admission: 2023   Date of Discharge:  2023   Delivering Physician: Mirtha Espana        Admission Diagnosis: 1. Pregnancy [Z34.90]   Discharge Diagnosis: 1. Same as above plus suspected fetal aortic coarctation  2. Pregnancy at 39w1d - delivered       Procedures: 2023  - Vaginal, Spontaneous       Hospital Course  Patient is a 27 y.o.  who at 39w1d had a vaginal birth.  Her postpartum course was without complications.  On PPD # 2 she was ready for discharge.  She had normal lochia and pain well controlled with oral medications.    Infant  female  fetus weighing 2951 g (6 lb 8.1 oz)   Apgars -  9 @ 1 minute /  9 @ 5 minutes.    Discharge labs  Lab Results   Component Value Date    WBC 15.62 (H) 2023    HGB 9.2 (L) 2023    HCT 27.4 (L) 2023     2023       Discharge Medications     Discharge Medications      New Medications      Instructions Start Date   acetaminophen 325 MG tablet  Commonly known as: TYLENOL   650 mg, Oral, Every 6 Hours PRN      ibuprofen 600 MG tablet  Commonly known as: ADVIL,MOTRIN   600 mg, Oral, Every 6 Hours PRN         Continue These Medications      Instructions Start Date   ferrous gluconate 324 MG tablet  Commonly known as: FERGON   324 mg, Oral, Daily With Breakfast      prenatal vitamin 27-0.8 27-0.8 MG tablet tablet   Oral, Daily             Discharge Disposition Home or Self Care   Condition on Discharge: good   Follow-up: 2 weeks with Dr. Koko Quintero MD  2023

## 2023-02-09 NOTE — LACTATION NOTE
"This note was copied from a baby's chart.  P1 T - new admission.   #[id number] assisted.  Mom reported earlier that she \"doesn't have milk & she didn't want to pump because she prefers to directly BF\".  She has been asleep all afternoon & is now ready to learn how to pump.  Dad is present & supportive, all education presented to both parents.    Demonstrated breast massage & hand expression.  No drops seen on left but drops easily expressed from the right breast.  All supplies given to mom, HGP set up & pumped with mom.  She expressed a puddle of colostrum which was taken promptly to the NICU.      Explained that breastmilk will be very important to her baby & praised parents for learning how to pump.  Rx faxed for personal breast pump.    Education provided:  Use & cleaning of pump including daily sterilization; frequency/duration of pumping; milk collection, storage/safe milk handling; breast massage; & hand expression.  Verbalized understanding.     Mother denies questions/concerns at this time.  Encouraged to call for help when needed.  LC # on WB.     "

## 2023-02-10 ENCOUNTER — REFERRAL TRIAGE (OUTPATIENT)
Dept: LABOR AND DELIVERY | Facility: HOSPITAL | Age: 28
End: 2023-02-10
Payer: MEDICAID

## 2023-02-10 ENCOUNTER — PATIENT OUTREACH (OUTPATIENT)
Dept: LABOR AND DELIVERY | Facility: HOSPITAL | Age: 28
End: 2023-02-10
Payer: MEDICAID

## 2023-02-10 NOTE — OUTREACH NOTE
Motherhood Connection  Enrollment    Current Estimated Gestational Age: 39w1d    Questions/Answers    Flowsheet Row Responses   Would like to participate? Yes   Date of Intake Visit 02/10/23        Mother[facility] Connection  Intake    Current Estimated Gestational Age: 39w1d    Intake Assessment    Flowsheet Row Responses   Best Method for Contacting Cell   Currently Employed No   Able to keep appointments as scheduled Yes   Gender(s) and Name(s) girl   Do you have a dentist? No   Resources Presently Utilizing: WIC (Women, Infant, Children)   Other Education Transportation Assistance, Insurance benefits/Incentives, How to find a pediatrician, [place] Benefits          Motherhood Connection  IP Postpartum    Questions/Answers    Flowsheet Row Responses   Best Method for Contacting Cell   Support Person Present Yes   Does the patient have a car seat at the hospital Yes   Delivery Note Reviewed Reviewed   Lactation Note Reviewed Other   Note Reviewed Other Comment brought pump to patient   Confirm Postpartum OB appointment --  [reviewed ]   Confirm initial well-child Pediatrician appointment date/time: --  [reviewed ]   Does patient have transportation to appointments? Yes  [sometimes ]   Does patient have supplies needed at home for  care? Clothing, Crib, Diapers, Formula        Visited the patient in the NICU at the request of the OB , spoke with her and FOB using the ipad . She was in need of a breast pump so brought her one along with some diapers. Lactation said they will visit with her to show her how to set up and use pump. FOTRE is living in [place] while mom lives in [place] with family. Recommended using [facility] peds for now and transferring to the clinic in [place] later if desired. Reviewed [employer] and brought number for mom to use to transport infant to any medical appointments. Reviewed follow up appts for mom and baby after discharge. FOB had questions  about mom's insurance status, will bring info on Monday to put them in touch with Kailyn Edwards. Pt states that they have a car seat, crib, and other infant supplies. F/u on Monday before discharge and at one week PP.     Krissy Ramirez RN  Maternity Nurse Navigator    2/10/2023, 15:02 EST

## 2023-02-13 NOTE — PROGRESS NOTES
Continued Stay Note  [facility]     Patient Name: Estrellita Bobby  MRN: [id number]  Today's Date: 2/13/2023    Admit Date: 2/6/2023    Plan: Infant may discharge to mother when medically ready. CSW will follow cord toxicology. JOSR Piedra   Discharge Plan     Row Name 02/13/23 0914       Plan    Plan Comments Mother: Estrellita Bobby, MRN [id number]. Infant: ObedaPengirl “Raven” Kanu, MRN [id number]. CSW has reviewed infant’s cord toxicology results and infant’s cord was negative for substances. Mandated CPS reporting is not required at this time. Elisabeth OVALLES.               Discharge Codes    No documentation.               Expected Discharge Date and Time     Expected Discharge Date Expected Discharge Time    Feb 9, 2023             SHERMAN Rodriguez

## 2023-02-16 ENCOUNTER — PATIENT OUTREACH (OUTPATIENT)
Dept: LABOR AND DELIVERY | Facility: HOSPITAL | Age: 28
End: 2023-02-16
Payer: MEDICAID

## 2023-02-16 NOTE — OUTREACH NOTE
Motherhood [facility]  Unable to Reach       Questions/Answers    Flowsheet Row Responses   Pending Outreach Postpartum Check-in   Call Attempt First   Outcome Not available   Unable to reach comments: no YAIMA         [id number]    Krissy BEAR - RN  Maternity Nurse Navigator    2/16/2023, 14:46 EST

## 2023-02-17 ENCOUNTER — PATIENT OUTREACH (OUTPATIENT)
Dept: LABOR AND DELIVERY | Facility: HOSPITAL | Age: 28
End: 2023-02-17
Payer: MEDICAID

## 2023-02-17 NOTE — OUTREACH NOTE
Motherhood Connection  Postpartum Check-In    Questions/Answers    Flowsheet Row Responses   Visit Setting Telephone   Best Method for Contacting Cell   OB Discharge Note Reviewed  Reviewed   OB Discharge Medications Reviewed  Reviewed    discharged home with mother? Yes   Current Pain Levels 0-10 3   At Rest Pain Levels 0-10 3   Pain level with activity 0-10 6   Acceptable Pain Level 0-10 5   Pain Location Perineum   Pain Description Sore   How do you treat your pain? Medications   Verbalized Emotional State Acceptance   Family/Support Network Sibling, Father   Level of Involvement in Care Attentive, Interactive, Supportive   Do you feel comfortable in your relationship with your baby? Yes   Have members of your household adjusted to your baby? Yes   Is the baby's father supportive and/or involved with the baby? Yes   How does your partner feel about the baby? Happy, Involved   Do you feel safe at home, school and work? Yes   Are you in a relationship with someone who threatens you or hurts you? No   Do you have the resources to keep yourself and your baby healthy and safe? Yes   Lochia (per patient report) Brown-harry Red   Amount Spotting   Number of pads per day 5   Lochia Odor None   Is patient breastfeeding? Yes, pumping   pumping - Left Breast Soft, Nontender   Pumping - Right Breast Soft, Nontender   Pumping - Left Nipple Intact   Pumping - Right Nipple Intact   Frequency every 2 hours during the day   Pumping Quantity 2-3 oz   Storage of Milk using in bottles   Postpartum Depression Screening Education Education Provided   Doctor Appointments: Education Provided   Breastfeeding Education Education Provided   Family Planning Education Education Provided   Postpartum Care Education Education Provided   Followup Appointments Made No  [reviewed]   Well Child Visit Appointments Made Yes   Well Child Checkup Provider Name started at [place] for check up already but switching to [facility],  appt next week   Well Child Check Up Date: 23   Umbilical Cord No reported signs or symptoms   Was the baby circumcised? No   Feeding Readiness Cues: Eager, Crying   Infant Feeding Method Breast, Expressed Breast Milk   Is a lactation referral indicated? No   Breastfeeding Right   Time breastfeeding left: 5-10 min   Time breastfeeding right: 5-10 min   Frequency of feedings 2-3 hours   Expressed milk PO (mL) 2-3 oz   Expressed milk- frequency of feedings every 2-3 hours   Number of wet diapers x 24 hours 8   Last BM x 24 hours 8   Emesis (Unmeasured Occurence) none   What safe sleep surface is available? Crib   Are there stuffed animals, toys, pillows, quilts, blankets, wedges, positioners, bumpers or other loose bedding in the infant's sleeping environment? No   Where does the baby usually sleep? Crib   Does the baby ever share a sleep surface with a sibling, adult or pet? No   Does the baby ever share a sleep surface in a bed, couch, recliner or other? No   What position do you place your baby to sleep for naps? Back   What position do you place your baby to sleep at night Back   Are you and/or other caregivers smoking inside or outside the baby's home? No   Is the infant dressed appropiately for the temperature of the home? Yes   Do you use a clean, dry pacifier that is not attached to a string or stuffed animal? --  [n/a ]        [place]  Depression Score: 4 (2023  3:32 PM)    Check in completed with phone . Pt reports that they are doing well. She does report an occasional clot in her vaginal bleeding, but none for several days. She is breastfeeding but mostly pumping, not supplementing with formula at this time. They established peds care in [place] and went to the first appt but are switching to [facility] with appt schedule for next week as this office is closer. The FOB was asking about a  screen that they were supposed to get done today. I called OCP to confirm  that they need to have this test redrawn and they do. Called parents back with this information and they will go to lab at the hospital today to have the test done. Also reminded pt that she needs to call for her follow up appt at the OB office. No other questions or resource needs at this time. Will send to RN call center.     Krissy Ramirez RN  Maternity Nurse Navigator    2/17/2023, 10:42 EST

## 2023-02-24 ENCOUNTER — PATIENT OUTREACH (OUTPATIENT)
Dept: CALL CENTER | Facility: HOSPITAL | Age: 28
End: 2023-02-24
Payer: MEDICAID

## 2023-02-24 NOTE — OUTREACH NOTE
Motherhood Connection Survey    Flowsheet Row Responses   [facility]   [facility] attempt successful? Yes  [[employer], [place], #[id number], [place]]   Call start time 1505   Call end time 1527   Baby sex Girl    discharged home with mother? Yes   Baby sex Girl   Delivery type Vaginal   Emotional state Acceptance   Family support Yes   Do you have all necessary resources to care for you and your baby?  Yes   Have members of your household adjusted to your baby? Yes   Did you have any problems with pre-eclampsia during this pregnancy? No   Did you have blood glucose issues during this pregnancy No   Did you have an episiotomy/tear/abdominal incision? Yes   Additional comments 2nd degree lac repair is healing   Feeding Method Combination   Frequency not breastfdg, bottles only, feeds q 2-3 hrs   Duration takes 2-3 oz   Pumping Yes   Supplementing Breast Milk, Formula   Frequency q 2-3 bottle fdg   Amount 2-3 oz MBM and formula   Breast Condition No   Nipple Condition No   Feeding tolerance Adequate pause for breath   Number of wet diapers x 24 hours 10-12   Last BM x 24 hours 8   Umbilical Cord No reported signs or symptoms   Umbilical cord comments cord off   Where does the baby usually sleep? Crib   Are there stuffed animals, toys, pillows, quilts, blankets, wedges, positioners, bumpers or other loose bedding in the infant's sleeping environment? No   Does the baby ever share a sleep surface in a bed, couch, recliner or other? No   What position do you lay your baby down to sleep? Back   Are you and/or other caregivers smoking inside or outside the baby's home? No   Mom appointment comments: PP appt f/u scheduled   Baby appointment comments: has had Peds visits   Additional comments eager to eat, states occasionally baby has SOB when feeding, when occurring, mother stops bottle fdg until SOB stops, then continues with feeding, has seen Peds for this issue   Call  completed? Yes            Lise BEAR - Registered Nurse

## 2024-04-03 ENCOUNTER — TELEPHONE (OUTPATIENT)
Dept: OBSTETRICS AND GYNECOLOGY | Facility: CLINIC | Age: 29
End: 2024-04-03

## 2024-04-03 NOTE — TELEPHONE ENCOUNTER
I got a call from [facility] in [place] stating that the patient's lmp was 10/7/23 and has an manju of 7/27/24, how should I go about scheduling her? Please advise

## 2024-04-11 ENCOUNTER — INITIAL PRENATAL (OUTPATIENT)
Dept: OBSTETRICS AND GYNECOLOGY | Facility: CLINIC | Age: 29
End: 2024-04-11
Payer: MEDICAID

## 2024-04-11 VITALS — DIASTOLIC BLOOD PRESSURE: 60 MMHG | SYSTOLIC BLOOD PRESSURE: 100 MMHG | WEIGHT: 132 LBS | BODY MASS INDEX: 24.6 KG/M2

## 2024-04-11 DIAGNOSIS — Z13.89 SCREENING FOR GENITOURINARY CONDITION: ICD-10-CM

## 2024-04-11 DIAGNOSIS — Z36.9 ENCOUNTER FOR ANTENATAL SCREENING, UNSPECIFIED: Primary | ICD-10-CM

## 2024-04-11 DIAGNOSIS — O22.00 VARICOSE VEINS DURING PREGNANCY: ICD-10-CM

## 2024-04-11 DIAGNOSIS — Q25.1 COARCTATION OF AORTA: ICD-10-CM

## 2024-04-11 LAB
B-HCG UR QL: POSITIVE
BILIRUB BLD-MCNC: NEGATIVE MG/DL
CLARITY, POC: CLEAR
COLOR UR: YELLOW
EXPIRATION DATE: ABNORMAL
GLUCOSE UR STRIP-MCNC: NEGATIVE MG/DL
INTERNAL NEGATIVE CONTROL: ABNORMAL
INTERNAL POSITIVE CONTROL: ABNORMAL
KETONES UR QL: NEGATIVE
LEUKOCYTE EST, POC: NEGATIVE
Lab: ABNORMAL
NITRITE UR-MCNC: NEGATIVE MG/ML
PH UR: 5 [PH] (ref 5–8)
PROT UR STRIP-MCNC: NEGATIVE MG/DL
RBC # UR STRIP: NEGATIVE /UL
SP GR UR: 1 (ref 1–1.03)
UROBILINOGEN UR QL: NORMAL

## 2024-04-11 NOTE — PROGRESS NOTES
Initial ob visit     Chief Complaint   Patient presents with    Initial Prenatal Visit       Estrellita Bobby is being seen today for her first obstetrical visit.  She is a 28 y.o.   32w0d gestation. This problem is new to me: yes      OB History          2    Para   1    Term   1            AB        Living   1         SAB        IAB        Ectopic        Molar        Multiple   0    Live Births   1                LNMP: 10/7/23  Confident with date: Yes  Taking prenatal vitamins: Yes  Planned pregnancy: No  Prior obstetric issues, potential pregnancy concerns:  x1  Family history of genetic issues (includes FOB): denies   Varicella Hx: uncertain   Flu vaccine: did not get  COVID Vaccine: has not had   History of STDs: denies   Current medications: PNV  Last pap smear:   Smoker: No  Drug or alcohol abuse: No  H/O Physical, mental or sexual abuse:denies   H/O mental health disorder: denies   Prior testing for Cystic Fibrosis Carrier or Sickle Cell Trait- CF, SMA, FX neg  Prepregnancy BMI: Body mass index is 24.6 kg/m².      No past medical history on file.    No past surgical history on file.      Current Outpatient Medications:     Prenatal Vit-Fe Fumarate-FA (prenatal vitamin 27-0.8) 27-0.8 MG tablet tablet, Take  by mouth Daily., Disp: , Rfl:     No Known Allergies    Social History     Socioeconomic History    Marital status: Significant Other   Tobacco Use    Smoking status: Never     Passive exposure: Never    Smokeless tobacco: Never   Vaping Use    Vaping status: Never Used   Substance and Sexual Activity    Alcohol use: Never    Drug use: Never    Sexual activity: Yes       No family history on file.    Review of systems     All other systems reviewed and are negative except for: Constitutional: positive for fatigue  Genitourinary: positive for bumps on the vulva     Objective    /60   Wt 59.9 kg (132 lb)   LMP 10/07/2023   BMI 24.60 kg/m²       General Appearance:     Alert, cooperative, in no acute distress, habitus normal    Head:    Not examined   Eyes:           Not examined   Ears:  Not examined       Neck:  No thyroid enlargement or nodules present   Back:     No kyphosis present, no scoliosis present,                       Lungs:     Clear to auscultation,respirations regular, even and                   unlabored    Heart:    Regular rhythm and normal rate, normal S1 and S2, no            murmur, no gallop, no rub, no click   Breast Exam:    Def   Abdomen:     Gravid    Genitalia:    Def       Extremities:   Moves all extremities well, no edema, no cyanosis, no              redness       Skin:   No bleeding, bruising or rash       Neurologic:   Sensation intact, A&O times 3      Assessment/Plan    1) Pregnancy at 26w5d- US IMP: Breech. Limited anatomy US d/t gestation age.  bpm. Male. CL 4.6cm.  US findings discussed with patient. EDC established 6/6/24 and confirmed by US.     2) OB exam: OB exam completed: Yes. New OB bag provided Yes. Pap collected: No.    3) Labs: OB labs collected: Yes Counseled on genetic screening: Yes, she is previously negative for CF/SMA/FX. Counseled on Quad screen and AFP: No, she missed  AFP. Counseled on NIPS: Yes, she desires NIPS.      4) Body mass index is 24.6 kg/m². For women with a normal weight, with a BMI between 18.5-24.5 before pregnancy, the recommended weight gain is 25-35 pounds for the entire pregnancy     5)  Prenatal care: Oriented to the office and prenatal care. Encourage prenatal vitamins. Disc Tylenol products are fine, avoid aspirin and ibuprofen; Zika (travel restrictions/ok to use insect repellant); not to change cat litter; food restrictions; exercise;  avoidance of alcohol, tobacco, drugs and saunas/hot tubs.     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     All questions answered.     RTO Tomorrow for labs- (new OB, NIPS, and 2hr GTT) and 2 weeks for OB visit     STANLEY Irving  4/11/2024  11:02 EDT

## 2024-04-12 ENCOUNTER — LAB (OUTPATIENT)
Dept: OBSTETRICS AND GYNECOLOGY | Facility: CLINIC | Age: 29
End: 2024-04-12
Payer: MEDICAID

## 2024-04-12 DIAGNOSIS — Z36.9 ENCOUNTER FOR ANTENATAL SCREENING, UNSPECIFIED: Primary | ICD-10-CM

## 2024-04-13 LAB
AMPHETAMINES UR QL SCN: NEGATIVE NG/ML
BACTERIA UR CULT: NO GROWTH
BACTERIA UR CULT: NORMAL
BARBITURATES UR QL SCN: NEGATIVE NG/ML
BENZODIAZ UR QL SCN: NEGATIVE NG/ML
BUPRENORPHINE UR QL: NEGATIVE NG/ML
BZE UR QL SCN: NEGATIVE NG/ML
C TRACH RRNA SPEC QL NAA+PROBE: POSITIVE
CANNABINOIDS UR QL SCN: NEGATIVE NG/ML
CREAT UR-MCNC: 81.1 MG/DL (ref 20–300)
FENTANYL UR-MCNC: NEGATIVE PG/ML
LABORATORY COMMENT REPORT: NORMAL
MEPERIDINE UR QL: NEGATIVE NG/ML
METHADONE UR QL SCN: NEGATIVE NG/ML
N GONORRHOEA RRNA SPEC QL NAA+PROBE: NEGATIVE
OPIATES UR QL SCN: NEGATIVE NG/ML
OXYCODONE+OXYMORPHONE UR QL SCN: NEGATIVE NG/ML
PCP UR QL: NEGATIVE NG/ML
PH UR: 7.3 [PH] (ref 4.5–8.9)
PROPOXYPH UR QL SCN: NEGATIVE NG/ML
T VAGINALIS RRNA SPEC QL NAA+PROBE: NEGATIVE
TRAMADOL UR QL SCN: NEGATIVE NG/ML

## 2024-04-15 LAB
ABO GROUP BLD: NORMAL
BASOPHILS # BLD AUTO: 0 X10E3/UL (ref 0–0.2)
BASOPHILS NFR BLD AUTO: 0 %
BLD GP AB SCN SERPL QL: NEGATIVE
EOSINOPHIL # BLD AUTO: 0 X10E3/UL (ref 0–0.4)
EOSINOPHIL NFR BLD AUTO: 0 %
ERYTHROCYTE [DISTWIDTH] IN BLOOD BY AUTOMATED COUNT: 14.1 % (ref 11.7–15.4)
GLUCOSE 1H P 75 G GLC PO SERPL-MCNC: 161 MG/DL (ref 70–179)
GLUCOSE 2H P 75 G GLC PO SERPL-MCNC: 154 MG/DL (ref 70–152)
GLUCOSE P FAST SERPL-MCNC: 71 MG/DL (ref 70–91)
HBA1C MFR BLD: 5.5 % (ref 4.8–5.6)
HBV SURFACE AG SERPL QL IA: NEGATIVE
HCT VFR BLD AUTO: 30.6 % (ref 34–46.6)
HCV IGG SERPL QL IA: NON REACTIVE
HGB A MFR BLD ELPH: 97.2 % (ref 96.4–98.8)
HGB A2 MFR BLD ELPH: 2.8 % (ref 1.8–3.2)
HGB BLD-MCNC: 10 G/DL (ref 11.1–15.9)
HGB F MFR BLD ELPH: 0 % (ref 0–2)
HGB FRACT BLD-IMP: NORMAL
HGB S MFR BLD ELPH: 0 %
HIV 1+2 AB+HIV1 P24 AG SERPL QL IA: NON REACTIVE
IMM GRANULOCYTES # BLD AUTO: 0 X10E3/UL (ref 0–0.1)
IMM GRANULOCYTES NFR BLD AUTO: 0 %
LYMPHOCYTES # BLD AUTO: 1.2 X10E3/UL (ref 0.7–3.1)
LYMPHOCYTES NFR BLD AUTO: 20 %
MCH RBC QN AUTO: 25.6 PG (ref 26.6–33)
MCHC RBC AUTO-ENTMCNC: 32.7 G/DL (ref 31.5–35.7)
MCV RBC AUTO: 79 FL (ref 79–97)
MONOCYTES # BLD AUTO: 0.4 X10E3/UL (ref 0.1–0.9)
MONOCYTES NFR BLD AUTO: 6 %
NEUTROPHILS # BLD AUTO: 4.2 X10E3/UL (ref 1.4–7)
NEUTROPHILS NFR BLD AUTO: 74 %
PLATELET # BLD AUTO: 286 X10E3/UL (ref 150–450)
RBC # BLD AUTO: 3.9 X10E6/UL (ref 3.77–5.28)
RH BLD: POSITIVE
RPR SER QL: NON REACTIVE
RUBV IGG SERPL IA-ACNC: 2.08 INDEX
VZV IGG SER IA-ACNC: 145 INDEX
WBC # BLD AUTO: 5.8 X10E3/UL (ref 3.4–10.8)

## 2024-04-15 RX ORDER — AZITHROMYCIN 500 MG/1
TABLET, FILM COATED ORAL
Qty: 2 TABLET | Refills: 0 | Status: SHIPPED | OUTPATIENT
Start: 2024-04-15

## 2024-04-17 DIAGNOSIS — O24.410 GDM (GESTATIONAL DIABETES MELLITUS), CLASS A1: Primary | ICD-10-CM

## 2024-04-17 RX ORDER — BLOOD-GLUCOSE METER
1 KIT MISCELLANEOUS AS NEEDED
Qty: 1 EACH | Refills: 0 | Status: SHIPPED | OUTPATIENT
Start: 2024-04-17

## 2024-04-17 RX ORDER — LANCETS 30 GAUGE
1 EACH MISCELLANEOUS 4 TIMES DAILY
Qty: 200 EACH | Refills: 2 | Status: SHIPPED | OUTPATIENT
Start: 2024-04-17

## 2024-04-17 RX ORDER — FERROUS GLUCONATE 324(38)MG
324 TABLET ORAL
Qty: 30 TABLET | Refills: 6 | Status: SHIPPED | OUTPATIENT
Start: 2024-04-17

## 2024-04-17 NOTE — PROGRESS NOTES
Varicella NI; Vaccine PP   2hr Gtt +; Needs GDM supplies and class; Will order f/u 1 week   Anemia noted; Fe+ Rx  All other labs reassuring

## 2024-05-01 ENCOUNTER — ROUTINE PRENATAL (OUTPATIENT)
Dept: OBSTETRICS AND GYNECOLOGY | Facility: CLINIC | Age: 29
End: 2024-05-01
Payer: MEDICAID

## 2024-05-01 VITALS — SYSTOLIC BLOOD PRESSURE: 110 MMHG | DIASTOLIC BLOOD PRESSURE: 62 MMHG | WEIGHT: 134 LBS | BODY MASS INDEX: 24.98 KG/M2

## 2024-05-01 DIAGNOSIS — Z36.9 ENCOUNTER FOR ANTENATAL SCREENING, UNSPECIFIED: Primary | ICD-10-CM

## 2024-05-01 DIAGNOSIS — A74.9 CHLAMYDIA INFECTION DURING PREGNANCY: ICD-10-CM

## 2024-05-01 DIAGNOSIS — Z3A.34 34 WEEKS GESTATION OF PREGNANCY: ICD-10-CM

## 2024-05-01 DIAGNOSIS — O24.410 DIET CONTROLLED GESTATIONAL DIABETES MELLITUS (GDM) IN THIRD TRIMESTER: ICD-10-CM

## 2024-05-01 DIAGNOSIS — O98.819 CHLAMYDIA INFECTION DURING PREGNANCY: ICD-10-CM

## 2024-05-01 LAB
BILIRUB BLD-MCNC: NEGATIVE MG/DL
CLARITY, POC: CLEAR
COLOR UR: YELLOW
GLUCOSE UR STRIP-MCNC: NEGATIVE MG/DL
KETONES UR QL: NEGATIVE
LEUKOCYTE EST, POC: NEGATIVE
NITRITE UR-MCNC: NEGATIVE MG/ML
PH UR: 6 [PH] (ref 5–8)
PROT UR STRIP-MCNC: NEGATIVE MG/DL
RBC # UR STRIP: NEGATIVE /UL
SP GR UR: 1.02 (ref 1–1.03)
UROBILINOGEN UR QL: NORMAL

## 2024-05-01 RX ORDER — BLOOD-GLUCOSE METER
EACH MISCELLANEOUS
COMMUNITY
Start: 2024-04-17

## 2024-05-01 NOTE — PROGRESS NOTES
OB follow up > 20 weeks    Chief Complaint   Patient presents with    Routine Prenatal Visit       Estrellita Bobby is a 29 y.o.  34w6d being seen today for her obstetrical visit.  Patient reports no complaints. Taking prenatal vitamins and iron. She has not started checking her BS yet. She has an upcoming appt with [facility] d/t h/o fetus with coarctation of the aorta.       Review of Systems  Genitourinary: Negative for contractions, cramping, vaginal bleeding, or SROM.   Fetal movement: normal  No Known Allergies     /62   Wt 60.8 kg (134 lb)   LMP 10/07/2023   BMI 24.98 kg/m²     FHT: 150s BPM   Uterine Size: size equals dates       Assessment    1) pregnancy at 34w6d-     2) T21 neg, male. CF/SMA/FX neg. Missed AFP.     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      5) Bangladeshi speaking-  used      6) Vulvar varicosities- Rec V strap and pelvic support belt.      7) + CT- Check LENORA today. S/P antibx and partner tx.     8) Varicella non immune- Offer Varivax postpartum     9) Anemia- Taking iron.     10) GDMA1- Failed 2hr GTT. Is not checking BS. Has not been to DM education. She has her supplies. Instructed on how to check BS and how to keep log.     Plan    Continue prenatal vitamins  Reviewed this stage of pregnancy  Problem list updated   Follow up in 1 weeks for review of BS log     STANLEY Irving  2024  14:55 EDT

## 2024-05-03 LAB
C TRACH RRNA SPEC QL NAA+PROBE: POSITIVE
N GONORRHOEA RRNA SPEC QL NAA+PROBE: NEGATIVE
T VAGINALIS RRNA SPEC QL NAA+PROBE: NEGATIVE

## 2024-05-06 RX ORDER — AZITHROMYCIN 500 MG/1
TABLET, FILM COATED ORAL
Qty: 2 TABLET | Refills: 0 | Status: SHIPPED | OUTPATIENT
Start: 2024-05-06

## 2024-05-08 ENCOUNTER — ROUTINE PRENATAL (OUTPATIENT)
Dept: OBSTETRICS AND GYNECOLOGY | Facility: CLINIC | Age: 29
End: 2024-05-08
Payer: MEDICAID

## 2024-05-08 VITALS — WEIGHT: 133 LBS | DIASTOLIC BLOOD PRESSURE: 64 MMHG | BODY MASS INDEX: 24.79 KG/M2 | SYSTOLIC BLOOD PRESSURE: 102 MMHG

## 2024-05-08 DIAGNOSIS — Z3A.35 35 WEEKS GESTATION OF PREGNANCY: ICD-10-CM

## 2024-05-08 DIAGNOSIS — D50.9 IRON DEFICIENCY ANEMIA DURING PREGNANCY: ICD-10-CM

## 2024-05-08 DIAGNOSIS — Q25.1 COARCTATION OF AORTA: Primary | ICD-10-CM

## 2024-05-08 DIAGNOSIS — O98.819 CHLAMYDIA INFECTION DURING PREGNANCY: ICD-10-CM

## 2024-05-08 DIAGNOSIS — O24.419 GESTATIONAL DIABETES MELLITUS, CLASS A2: ICD-10-CM

## 2024-05-08 DIAGNOSIS — A74.9 CHLAMYDIA INFECTION DURING PREGNANCY: ICD-10-CM

## 2024-05-08 DIAGNOSIS — Z78.9 USES SPANISH AS PRIMARY SPOKEN LANGUAGE: ICD-10-CM

## 2024-05-08 DIAGNOSIS — Z36.9 ENCOUNTER FOR ANTENATAL SCREENING, UNSPECIFIED: ICD-10-CM

## 2024-05-08 DIAGNOSIS — O09.30: ICD-10-CM

## 2024-05-08 DIAGNOSIS — O99.019 IRON DEFICIENCY ANEMIA DURING PREGNANCY: ICD-10-CM

## 2024-05-08 PROBLEM — Z3A.34 34 WEEKS GESTATION OF PREGNANCY: Status: RESOLVED | Noted: 2023-01-04 | Resolved: 2024-05-08

## 2024-05-08 LAB
BILIRUB BLD-MCNC: NEGATIVE MG/DL
CLARITY, POC: CLEAR
COLOR UR: YELLOW
GLUCOSE UR STRIP-MCNC: NEGATIVE MG/DL
KETONES UR QL: NEGATIVE
LEUKOCYTE EST, POC: NEGATIVE
NITRITE UR-MCNC: NEGATIVE MG/ML
PH UR: 5 [PH] (ref 5–8)
PROT UR STRIP-MCNC: NEGATIVE MG/DL
RBC # UR STRIP: NEGATIVE /UL
SP GR UR: 1.02 (ref 1–1.03)
UROBILINOGEN UR QL: NORMAL

## 2024-05-08 RX ORDER — GLYBURIDE 2.5 MG/1
2.5 TABLET ORAL
Qty: 30 TABLET | Refills: 1 | Status: SHIPPED | OUTPATIENT
Start: 2024-05-08

## 2024-05-20 ENCOUNTER — ROUTINE PRENATAL (OUTPATIENT)
Dept: OBSTETRICS AND GYNECOLOGY | Facility: CLINIC | Age: 29
End: 2024-05-20
Payer: MEDICAID

## 2024-05-20 VITALS — BODY MASS INDEX: 24.98 KG/M2 | DIASTOLIC BLOOD PRESSURE: 82 MMHG | SYSTOLIC BLOOD PRESSURE: 124 MMHG | WEIGHT: 134 LBS

## 2024-05-20 DIAGNOSIS — Q25.1 COARCTATION OF AORTA: ICD-10-CM

## 2024-05-20 DIAGNOSIS — A74.9 CHLAMYDIA INFECTION DURING PREGNANCY: ICD-10-CM

## 2024-05-20 DIAGNOSIS — Z36.9 ENCOUNTER FOR ANTENATAL SCREENING, UNSPECIFIED: Primary | ICD-10-CM

## 2024-05-20 DIAGNOSIS — O24.419 GESTATIONAL DIABETES MELLITUS, CLASS A2: ICD-10-CM

## 2024-05-20 DIAGNOSIS — Z3A.37 37 WEEKS GESTATION OF PREGNANCY: ICD-10-CM

## 2024-05-20 DIAGNOSIS — D50.9 IRON DEFICIENCY ANEMIA DURING PREGNANCY: ICD-10-CM

## 2024-05-20 DIAGNOSIS — Z78.9 USES SPANISH AS PRIMARY SPOKEN LANGUAGE: ICD-10-CM

## 2024-05-20 DIAGNOSIS — O09.30: ICD-10-CM

## 2024-05-20 DIAGNOSIS — O99.019 IRON DEFICIENCY ANEMIA DURING PREGNANCY: ICD-10-CM

## 2024-05-20 DIAGNOSIS — O98.819 CHLAMYDIA INFECTION DURING PREGNANCY: ICD-10-CM

## 2024-05-20 LAB
BILIRUB BLD-MCNC: NEGATIVE MG/DL
CLARITY, POC: CLEAR
COLOR UR: YELLOW
GLUCOSE UR STRIP-MCNC: NEGATIVE MG/DL
KETONES UR QL: NEGATIVE
LEUKOCYTE EST, POC: NEGATIVE
NITRITE UR-MCNC: NEGATIVE MG/ML
PH UR: 5 [PH] (ref 5–8)
PROT UR STRIP-MCNC: NEGATIVE MG/DL
RBC # UR STRIP: NEGATIVE /UL
SP GR UR: 1 (ref 1–1.03)
UROBILINOGEN UR QL: NORMAL

## 2024-05-20 PROCEDURE — 99213 OFFICE O/P EST LOW 20 MIN: CPT | Performed by: STUDENT IN AN ORGANIZED HEALTH CARE EDUCATION/TRAINING PROGRAM

## 2024-05-20 NOTE — PROGRESS NOTES
"OB follow up > 20 weeks    Chief Complaint   Patient presents with    Routine Prenatal Visit       Estrellita Bobby is a 29 y.o.  37+ being seen today for her obstetrical visit.  Patient reports no complaints. Taking prenatal vitamins and iron. Taking her BS.        Review of Systems  Genitourinary: Negative for contractions, cramping, vaginal bleeding, or SROM.   Fetal movement: normal  No Known Allergies     /82   Wt 60.8 kg (134 lb)   LMP 10/07/2023   BMI 24.98 kg/m²     Vitals: VSS; AF    General Appearance:  Awake. Alert. Well developed. Well nourished. In no acute distress.    Visual Inspection: ° Abdomen was normal on visual inspection.  Palpation: ° Abdomen was soft. ° Abdominal non-tender.    Uterus: ° Fundal height was normal for gestational age. ° Not tender.  Uterine Adnexae: ° Normal without masses or tenderness.  Neurological:  ° Oriented to time, place, and person.  Skin:  ° General appearance was normal. No bruising or ecchymosis.  Obstetrical: +FM and FCA    Presentation: VTX  Placenta: Posterior  MASON: 15.75cm  FCA: 130's      BPP          Ultrasound images and report reviewed personally by me.    Full interpretation of ultrasound can be found in the patient's note on the same date of service.     Jericho Ceron, DO      [facility] US 8May2  IMPRESSION:       Intrauterine ordaz pregnancy at 35w 6d gestation    Cephalic presentation    Posterior, Grade 2 placenta, no previa    The limited anatomical survey appears normal    The late limited fetal cardiac survey appears normal    Cannoot obtain a clear sagital view of Aortic arch    seconday to fetal position and late gestational age.    Three vessel view along with dopplers of aortic isthmus    do not raise any concern for coarctation on today's    exam.    No clinical suspicion for fetal aortic coarctation given    available images    Limitations discussed    The fetal growth is at the 38%tile, 2644g (5'13\")    The amniotic " fluid index is normal at 17.51 cm    The biophysical profile is 8/8     Assessment    1) pregnancy at 37+   GBS today ( )     2) T21 neg, male. CF/SMA/FX neg. Missed AFP.     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      7) + CT- Check LENORA today.   LENORA next appt ( )     8) Varicella non immune- Offer Varivax postpartum     9) Anemia- Taking iron.     10) GDMA2-   Elevated  Pt is taking before breakfast and dinner and 2 hours after  We recommend Fasting and 2hr PP   BPP weekly ( )   Recommend IOL 39wga ( )     Plan    Continue prenatal vitamins  Reviewed this stage of pregnancy  Problem list updated   Follow up in 1 weeks for review of BS log , LENORA next week, GBS pending     Jericho Ceron,   5/20/2024  14:56 EDT

## 2024-05-24 LAB — B-HEM STREP SPEC QL CULT: NEGATIVE

## 2024-05-28 ENCOUNTER — ROUTINE PRENATAL (OUTPATIENT)
Dept: OBSTETRICS AND GYNECOLOGY | Facility: CLINIC | Age: 29
End: 2024-05-28
Payer: MEDICAID

## 2024-05-28 VITALS — SYSTOLIC BLOOD PRESSURE: 108 MMHG | BODY MASS INDEX: 25.27 KG/M2 | WEIGHT: 135.6 LBS | DIASTOLIC BLOOD PRESSURE: 64 MMHG

## 2024-05-28 DIAGNOSIS — O98.819 CHLAMYDIA INFECTION DURING PREGNANCY: ICD-10-CM

## 2024-05-28 DIAGNOSIS — O24.419 GESTATIONAL DIABETES MELLITUS, CLASS A2: Primary | ICD-10-CM

## 2024-05-28 DIAGNOSIS — Z78.9 USES SPANISH AS PRIMARY SPOKEN LANGUAGE: ICD-10-CM

## 2024-05-28 DIAGNOSIS — O09.30: ICD-10-CM

## 2024-05-28 DIAGNOSIS — D50.9 IRON DEFICIENCY ANEMIA DURING PREGNANCY: ICD-10-CM

## 2024-05-28 DIAGNOSIS — Z3A.38 38 WEEKS GESTATION OF PREGNANCY: ICD-10-CM

## 2024-05-28 DIAGNOSIS — O99.019 IRON DEFICIENCY ANEMIA DURING PREGNANCY: ICD-10-CM

## 2024-05-28 DIAGNOSIS — A74.9 CHLAMYDIA INFECTION DURING PREGNANCY: ICD-10-CM

## 2024-05-28 PROCEDURE — 99214 OFFICE O/P EST MOD 30 MIN: CPT | Performed by: STUDENT IN AN ORGANIZED HEALTH CARE EDUCATION/TRAINING PROGRAM

## 2024-05-28 NOTE — PROGRESS NOTES
OB follow up > 20 weeks    Chief Complaint   Patient presents with    Routine Prenatal Visit       Estrellita Bobby is a 29 y.o.  38+ being seen today for her obstetrical visit.  Patient reports no complaints. Taking prenatal vitamins and iron. Taking her BS.        Review of Systems  Genitourinary: Negative for contractions, cramping, vaginal bleeding, or SROM.   Fetal movement: normal  No Known Allergies     /64   Wt 61.5 kg (135 lb 9.6 oz)   LMP 10/07/2023   BMI 25.27 kg/m²     Vitals: VSS; AF    General Appearance:  Awake. Alert. Well developed. Well nourished. In no acute distress.    Visual Inspection: ° Abdomen was normal on visual inspection.  Palpation: ° Abdomen was soft. ° Abdominal non-tender.    Uterus: ° Fundal height was normal for gestational age. ° Not tender.  Uterine Adnexae: ° Normal without masses or tenderness.  Neurological:  ° Oriented to time, place, and person.  Skin:  ° General appearance was normal. No bruising or ecchymosis.  Obstetrical: +FM and FCA      Assessment    1) Pregnancy at 38+   GBS Neg    2) T21 neg, male. CF/SMA/FX neg. Missed AFP.     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      7) + CT- Check LENORA today.   LENORA next appt + May 24     8) Varicella non immune- Offer Varivax postpartum     9) Anemia- Taking iron.     10) GDMA2-   Elevated    BPP weekly (X )   Recommend IOL 39wga (X )   Will have IOL as her 1yo has surgery this week; reasonable     Plan    Continue prenatal vitamins  Reviewed this stage of pregnancy  Problem list updated   IOL on Monday ; Her 1 yo has surgery; she declines IOL this week   39+4 IOL     Jericho Ceron, DO  2024  14:55 EDT

## 2024-06-03 ENCOUNTER — HOSPITAL ENCOUNTER (INPATIENT)
Facility: HOSPITAL | Age: 29
LOS: 2 days | Discharge: HOME | End: 2024-06-05
Attending: STUDENT IN AN ORGANIZED HEALTH CARE EDUCATION/TRAINING PROGRAM | Admitting: STUDENT IN AN ORGANIZED HEALTH CARE EDUCATION/TRAINING PROGRAM
Payer: MEDICAID

## 2024-06-03 LAB
ABO GROUP BLD: NORMAL
ABO GROUP BLD: NORMAL
ALBUMIN SERPL-MCNC: 3.5 G/DL (ref 3.5–5.2)
ALBUMIN/GLOB SERPL: 0.9 G/DL
ALP SERPL-CCNC: 239 U/L (ref 39–117)
ALT SERPL W P-5'-P-CCNC: 20 U/L (ref 1–33)
AMPHET+METHAMPHET UR QL: NEGATIVE
AMPHETAMINES UR QL: NEGATIVE
ANION GAP SERPL CALCULATED.3IONS-SCNC: 11.6 MMOL/L (ref 5–15)
AST SERPL-CCNC: 24 U/L (ref 1–32)
BACTERIA UR QL AUTO: ABNORMAL /HPF
BARBITURATES UR QL SCN: NEGATIVE
BENZODIAZ UR QL SCN: NEGATIVE
BILIRUB SERPL-MCNC: 0.2 MG/DL (ref 0–1.2)
BILIRUB UR QL STRIP: NEGATIVE
BLD GP AB SCN SERPL QL: NEGATIVE
BUN SERPL-MCNC: 11 MG/DL (ref 6–20)
BUN/CREAT SERPL: 23.9 (ref 7–25)
BUPRENORPHINE SERPL-MCNC: NEGATIVE NG/ML
CALCIUM SPEC-SCNC: 9.3 MG/DL (ref 8.6–10.5)
CANNABINOIDS SERPL QL: NEGATIVE
CHLORIDE SERPL-SCNC: 104 MMOL/L (ref 98–107)
CLARITY UR: CLEAR
CO2 SERPL-SCNC: 20.4 MMOL/L (ref 22–29)
COCAINE UR QL: NEGATIVE
COLOR UR: YELLOW
CREAT SERPL-MCNC: 0.46 MG/DL (ref 0.57–1)
DEPRECATED RDW RBC AUTO: 54.8 FL (ref 37–54)
EGFRCR SERPLBLD CKD-EPI 2021: 132.2 ML/MIN/1.73
ERYTHROCYTE [DISTWIDTH] IN BLOOD BY AUTOMATED COUNT: 17.9 % (ref 12.3–15.4)
GLOBULIN UR ELPH-MCNC: 3.7 GM/DL
GLUCOSE SERPL-MCNC: 75 MG/DL (ref 65–99)
GLUCOSE UR STRIP-MCNC: NEGATIVE MG/DL
HCT VFR BLD AUTO: 37 % (ref 34–46.6)
HGB BLD-MCNC: 12.1 G/DL (ref 12–15.9)
HGB UR QL STRIP.AUTO: ABNORMAL
HYALINE CASTS UR QL AUTO: ABNORMAL /LPF
KETONES UR QL STRIP: NEGATIVE
LEUKOCYTE ESTERASE UR QL STRIP.AUTO: ABNORMAL
MCH RBC QN AUTO: 27.4 PG (ref 26.6–33)
MCHC RBC AUTO-ENTMCNC: 32.7 G/DL (ref 31.5–35.7)
MCV RBC AUTO: 83.7 FL (ref 79–97)
METHADONE UR QL SCN: NEGATIVE
NITRITE UR QL STRIP: NEGATIVE
OPIATES UR QL: NEGATIVE
OXYCODONE UR QL SCN: NEGATIVE
PCP UR QL SCN: NEGATIVE
PH UR STRIP.AUTO: 6.5 [PH] (ref 4.5–8)
PLATELET # BLD AUTO: 256 10*3/MM3 (ref 140–450)
PMV BLD AUTO: 10.3 FL (ref 6–12)
POTASSIUM SERPL-SCNC: 4.5 MMOL/L (ref 3.5–5.2)
PROT SERPL-MCNC: 7.2 G/DL (ref 6–8.5)
PROT UR QL STRIP: NEGATIVE
RBC # BLD AUTO: 4.42 10*6/MM3 (ref 3.77–5.28)
RBC # UR STRIP: ABNORMAL /HPF
REF LAB TEST METHOD: ABNORMAL
RH BLD: POSITIVE
RH BLD: POSITIVE
SODIUM SERPL-SCNC: 136 MMOL/L (ref 136–145)
SP GR UR STRIP: 1.01 (ref 1–1.03)
SQUAMOUS #/AREA URNS HPF: ABNORMAL /HPF
T PALLIDUM IGG SER QL: NORMAL
T&S EXPIRATION DATE: NORMAL
TRICYCLICS UR QL SCN: NEGATIVE
UROBILINOGEN UR QL STRIP: ABNORMAL
WBC # UR STRIP: ABNORMAL /HPF
WBC NRBC COR # BLD AUTO: 6.05 10*3/MM3 (ref 3.4–10.8)

## 2024-06-03 PROCEDURE — 86850 RBC ANTIBODY SCREEN: CPT | Performed by: STUDENT IN AN ORGANIZED HEALTH CARE EDUCATION/TRAINING PROGRAM

## 2024-06-03 PROCEDURE — 85027 COMPLETE CBC AUTOMATED: CPT | Performed by: STUDENT IN AN ORGANIZED HEALTH CARE EDUCATION/TRAINING PROGRAM

## 2024-06-03 PROCEDURE — S0260 H&P FOR SURGERY: HCPCS | Performed by: STUDENT IN AN ORGANIZED HEALTH CARE EDUCATION/TRAINING PROGRAM

## 2024-06-03 PROCEDURE — 86900 BLOOD TYPING SEROLOGIC ABO: CPT | Performed by: STUDENT IN AN ORGANIZED HEALTH CARE EDUCATION/TRAINING PROGRAM

## 2024-06-03 PROCEDURE — 87491 CHLMYD TRACH DNA AMP PROBE: CPT | Performed by: STUDENT IN AN ORGANIZED HEALTH CARE EDUCATION/TRAINING PROGRAM

## 2024-06-03 PROCEDURE — 88307 TISSUE EXAM BY PATHOLOGIST: CPT

## 2024-06-03 PROCEDURE — 80306 DRUG TEST PRSMV INSTRMNT: CPT | Performed by: STUDENT IN AN ORGANIZED HEALTH CARE EDUCATION/TRAINING PROGRAM

## 2024-06-03 PROCEDURE — 87591 N.GONORRHOEAE DNA AMP PROB: CPT | Performed by: STUDENT IN AN ORGANIZED HEALTH CARE EDUCATION/TRAINING PROGRAM

## 2024-06-03 PROCEDURE — 86901 BLOOD TYPING SEROLOGIC RH(D): CPT | Performed by: STUDENT IN AN ORGANIZED HEALTH CARE EDUCATION/TRAINING PROGRAM

## 2024-06-03 PROCEDURE — 86900 BLOOD TYPING SEROLOGIC ABO: CPT

## 2024-06-03 PROCEDURE — 25810000003 LACTATED RINGERS PER 1000 ML: Performed by: STUDENT IN AN ORGANIZED HEALTH CARE EDUCATION/TRAINING PROGRAM

## 2024-06-03 PROCEDURE — 81001 URINALYSIS AUTO W/SCOPE: CPT | Performed by: STUDENT IN AN ORGANIZED HEALTH CARE EDUCATION/TRAINING PROGRAM

## 2024-06-03 PROCEDURE — 80053 COMPREHEN METABOLIC PANEL: CPT | Performed by: STUDENT IN AN ORGANIZED HEALTH CARE EDUCATION/TRAINING PROGRAM

## 2024-06-03 PROCEDURE — 10907ZC DRAINAGE OF AMNIOTIC FLUID, THERAPEUTIC FROM PRODUCTS OF CONCEPTION, VIA NATURAL OR ARTIFICIAL OPENING: ICD-10-PCS | Performed by: OBSTETRICS & GYNECOLOGY

## 2024-06-03 PROCEDURE — 59410 OBSTETRICAL CARE: CPT | Performed by: OBSTETRICS & GYNECOLOGY

## 2024-06-03 PROCEDURE — 86901 BLOOD TYPING SEROLOGIC RH(D): CPT

## 2024-06-03 PROCEDURE — 86780 TREPONEMA PALLIDUM: CPT | Performed by: STUDENT IN AN ORGANIZED HEALTH CARE EDUCATION/TRAINING PROGRAM

## 2024-06-03 PROCEDURE — 0KQM0ZZ REPAIR PERINEUM MUSCLE, OPEN APPROACH: ICD-10-PCS | Performed by: OBSTETRICS & GYNECOLOGY

## 2024-06-03 RX ORDER — ONDANSETRON 2 MG/ML
4 INJECTION INTRAMUSCULAR; INTRAVENOUS EVERY 6 HOURS PRN
Status: DISCONTINUED | OUTPATIENT
Start: 2024-06-03 | End: 2024-06-05 | Stop reason: HOSPADM

## 2024-06-03 RX ORDER — METHYLERGONOVINE MALEATE 0.2 MG/ML
200 INJECTION INTRAVENOUS ONCE AS NEEDED
Status: DISCONTINUED | OUTPATIENT
Start: 2024-06-03 | End: 2024-06-04

## 2024-06-03 RX ORDER — IBUPROFEN 600 MG/1
600 TABLET ORAL EVERY 6 HOURS PRN
Status: DISCONTINUED | OUTPATIENT
Start: 2024-06-03 | End: 2024-06-05 | Stop reason: HOSPADM

## 2024-06-03 RX ORDER — LIDOCAINE HYDROCHLORIDE 10 MG/ML
10 INJECTION, SOLUTION INFILTRATION; PERINEURAL ONCE
Status: COMPLETED | OUTPATIENT
Start: 2024-06-03 | End: 2024-06-03

## 2024-06-03 RX ORDER — OXYTOCIN/0.9 % SODIUM CHLORIDE 30/500 ML
250 PLASTIC BAG, INJECTION (ML) INTRAVENOUS CONTINUOUS
Status: ACTIVE | OUTPATIENT
Start: 2024-06-03 | End: 2024-06-03

## 2024-06-03 RX ORDER — ONDANSETRON 4 MG/1
4 TABLET, ORALLY DISINTEGRATING ORAL EVERY 8 HOURS PRN
Status: DISCONTINUED | OUTPATIENT
Start: 2024-06-03 | End: 2024-06-05 | Stop reason: HOSPADM

## 2024-06-03 RX ORDER — OXYTOCIN/0.9 % SODIUM CHLORIDE 30/500 ML
999 PLASTIC BAG, INJECTION (ML) INTRAVENOUS ONCE
Status: COMPLETED | OUTPATIENT
Start: 2024-06-03 | End: 2024-06-03

## 2024-06-03 RX ORDER — OXYTOCIN/0.9 % SODIUM CHLORIDE 30/500 ML
125 PLASTIC BAG, INJECTION (ML) INTRAVENOUS ONCE AS NEEDED
Status: DISCONTINUED | OUTPATIENT
Start: 2024-06-03 | End: 2024-06-05 | Stop reason: HOSPADM

## 2024-06-03 RX ORDER — MISOPROSTOL 200 UG/1
800 TABLET ORAL ONCE AS NEEDED
Status: DISCONTINUED | OUTPATIENT
Start: 2024-06-03 | End: 2024-06-04

## 2024-06-03 RX ORDER — DOCUSATE SODIUM 100 MG/1
100 CAPSULE, LIQUID FILLED ORAL 2 TIMES DAILY
Status: DISCONTINUED | OUTPATIENT
Start: 2024-06-03 | End: 2024-06-05 | Stop reason: HOSPADM

## 2024-06-03 RX ORDER — SODIUM CHLORIDE 0.9 % (FLUSH) 0.9 %
1-10 SYRINGE (ML) INJECTION AS NEEDED
Status: DISCONTINUED | OUTPATIENT
Start: 2024-06-03 | End: 2024-06-05 | Stop reason: HOSPADM

## 2024-06-03 RX ORDER — PRENATAL VIT/IRON FUM/FOLIC AC 27MG-0.8MG
1 TABLET ORAL DAILY
Status: DISCONTINUED | OUTPATIENT
Start: 2024-06-03 | End: 2024-06-05 | Stop reason: HOSPADM

## 2024-06-03 RX ORDER — PROMETHAZINE HYDROCHLORIDE 25 MG/1
12.5 TABLET ORAL EVERY 4 HOURS PRN
Status: DISCONTINUED | OUTPATIENT
Start: 2024-06-03 | End: 2024-06-05 | Stop reason: HOSPADM

## 2024-06-03 RX ORDER — HYDROCORTISONE 25 MG/G
1 CREAM TOPICAL AS NEEDED
Status: DISCONTINUED | OUTPATIENT
Start: 2024-06-03 | End: 2024-06-05 | Stop reason: HOSPADM

## 2024-06-03 RX ORDER — BISACODYL 10 MG
10 SUPPOSITORY, RECTAL RECTAL DAILY PRN
Status: DISCONTINUED | OUTPATIENT
Start: 2024-06-04 | End: 2024-06-05 | Stop reason: HOSPADM

## 2024-06-03 RX ORDER — HYDROCODONE BITARTRATE AND ACETAMINOPHEN 10; 325 MG/1; MG/1
1 TABLET ORAL EVERY 4 HOURS PRN
Status: DISCONTINUED | OUTPATIENT
Start: 2024-06-03 | End: 2024-06-05 | Stop reason: HOSPADM

## 2024-06-03 RX ORDER — HYDROCODONE BITARTRATE AND ACETAMINOPHEN 5; 325 MG/1; MG/1
1 TABLET ORAL EVERY 4 HOURS PRN
Status: DISCONTINUED | OUTPATIENT
Start: 2024-06-03 | End: 2024-06-05 | Stop reason: HOSPADM

## 2024-06-03 RX ORDER — CALCIUM CARBONATE 500 MG/1
2 TABLET, CHEWABLE ORAL 3 TIMES DAILY PRN
Status: DISCONTINUED | OUTPATIENT
Start: 2024-06-03 | End: 2024-06-05 | Stop reason: HOSPADM

## 2024-06-03 RX ORDER — FERROUS GLUCONATE 324(37.5)
324 TABLET ORAL
Status: DISCONTINUED | OUTPATIENT
Start: 2024-06-04 | End: 2024-06-05 | Stop reason: HOSPADM

## 2024-06-03 RX ORDER — METHYLERGONOVINE MALEATE 0.2 MG/ML
200 INJECTION INTRAVENOUS ONCE AS NEEDED
Status: DISCONTINUED | OUTPATIENT
Start: 2024-06-03 | End: 2024-06-05 | Stop reason: HOSPADM

## 2024-06-03 RX ORDER — MISOPROSTOL 200 UG/1
600 TABLET ORAL ONCE AS NEEDED
Status: DISCONTINUED | OUTPATIENT
Start: 2024-06-03 | End: 2024-06-05 | Stop reason: HOSPADM

## 2024-06-03 RX ORDER — CARBOPROST TROMETHAMINE 250 UG/ML
250 INJECTION, SOLUTION INTRAMUSCULAR
Status: DISCONTINUED | OUTPATIENT
Start: 2024-06-03 | End: 2024-06-04

## 2024-06-03 RX ORDER — OXYTOCIN/0.9 % SODIUM CHLORIDE 30/500 ML
2-20 PLASTIC BAG, INJECTION (ML) INTRAVENOUS
Status: DISCONTINUED | OUTPATIENT
Start: 2024-06-03 | End: 2024-06-04

## 2024-06-03 RX ORDER — SODIUM CHLORIDE, SODIUM LACTATE, POTASSIUM CHLORIDE, CALCIUM CHLORIDE 600; 310; 30; 20 MG/100ML; MG/100ML; MG/100ML; MG/100ML
125 INJECTION, SOLUTION INTRAVENOUS CONTINUOUS
Status: DISCONTINUED | OUTPATIENT
Start: 2024-06-03 | End: 2024-06-05 | Stop reason: HOSPADM

## 2024-06-03 RX ORDER — LIDOCAINE HYDROCHLORIDE 10 MG/ML
INJECTION, SOLUTION EPIDURAL; INFILTRATION; INTRACAUDAL; PERINEURAL
Status: COMPLETED
Start: 2024-06-03 | End: 2024-06-03

## 2024-06-03 RX ORDER — DIPHENHYDRAMINE HCL 25 MG
25 CAPSULE ORAL NIGHTLY PRN
Status: DISCONTINUED | OUTPATIENT
Start: 2024-06-03 | End: 2024-06-05 | Stop reason: HOSPADM

## 2024-06-03 RX ORDER — MAGNESIUM CARB/ALUMINUM HYDROX 105-160MG
30 TABLET,CHEWABLE ORAL ONCE
Status: DISCONTINUED | OUTPATIENT
Start: 2024-06-03 | End: 2024-06-04

## 2024-06-03 RX ORDER — ACETAMINOPHEN 325 MG/1
650 TABLET ORAL EVERY 4 HOURS PRN
Status: DISCONTINUED | OUTPATIENT
Start: 2024-06-03 | End: 2024-06-04

## 2024-06-03 RX ORDER — ACETAMINOPHEN 325 MG/1
650 TABLET ORAL EVERY 6 HOURS PRN
Status: DISCONTINUED | OUTPATIENT
Start: 2024-06-03 | End: 2024-06-05 | Stop reason: HOSPADM

## 2024-06-03 RX ADMIN — SODIUM CHLORIDE, POTASSIUM CHLORIDE, SODIUM LACTATE AND CALCIUM CHLORIDE 125 ML/HR: 600; 310; 30; 20 INJECTION, SOLUTION INTRAVENOUS at 09:30

## 2024-06-03 RX ADMIN — Medication 250 ML/HR: at 19:06

## 2024-06-03 RX ADMIN — Medication 999 ML/HR: at 18:50

## 2024-06-03 RX ADMIN — IBUPROFEN 600 MG: 600 TABLET, FILM COATED ORAL at 19:41

## 2024-06-03 RX ADMIN — LIDOCAINE HYDROCHLORIDE 10 ML: 10 INJECTION, SOLUTION INFILTRATION; PERINEURAL at 19:08

## 2024-06-03 RX ADMIN — SODIUM CHLORIDE, POTASSIUM CHLORIDE, SODIUM LACTATE AND CALCIUM CHLORIDE 125 ML/HR: 600; 310; 30; 20 INJECTION, SOLUTION INTRAVENOUS at 16:27

## 2024-06-03 RX ADMIN — LIDOCAINE HYDROCHLORIDE 10 ML: 10 INJECTION, SOLUTION EPIDURAL; INFILTRATION; INTRACAUDAL; PERINEURAL at 19:08

## 2024-06-03 RX ADMIN — Medication 2 MILLI-UNITS/MIN: at 09:30

## 2024-06-03 NOTE — H&P
Patient Care Team:  Provider, No Known as PCP - Cinthya Avalos APRN as Nurse Practitioner (Obstetrics and Gynecology)    Chief complaint IOL       HPI: 30yo  @ 39+4 presents for IOL. Ctx; denies VB or LOF     ROS:   Constitutional: Weight change and appetite change present.   Respiratory: Negative for cough and shortness of breath.    Cardiovascular: Negative for chest pain and palpitations.   Gastrointestinal: Neg    Endocrine: Negative.    Genitourinary: + missed menses, no dysuria   Skin: Negative.    Neurological: Negative for dizziness and headaches.   Hematological: Negative.    Psychiatric/Behavioral: Negative for dysphoric mood and sleep disturbance. The patient is not nervous/anxious.        PMH:   Past Medical History:   Diagnosis Date    Anemia     Chlamydia          PSH: No past surgical history on file.    SoHx:   Social History     Socioeconomic History    Marital status: Significant Other   Tobacco Use    Smoking status: Never     Passive exposure: Never    Smokeless tobacco: Never   Vaping Use    Vaping status: Never Used   Substance and Sexual Activity    Alcohol use: Never    Drug use: Never    Sexual activity: Yes       FHx: No family history on file.    PGyn Hx: See office notes     POBHx:  6.8#     Allergies: Patient has no known allergies.    Medications:   No current facility-administered medications on file prior to encounter.     Current Outpatient Medications on File Prior to Encounter   Medication Sig Dispense Refill    ferrous gluconate (FERGON) 324 MG tablet Take 1 tablet by mouth Daily With Breakfast. 30 tablet 6    glyburide (DIAbeta) 2.5 MG tablet Take 1 tablet by mouth every night at bedtime. 30 tablet 1    Prenatal Vit-Fe Fumarate-FA (prenatal vitamin 27-0.8) 27-0.8 MG tablet tablet Take  by mouth Daily.      Blood Glucose Monitoring Suppl (ONE TOUCH ULTRA 2) w/Device kit       glucose blood test strip Use as instructed 200 each 2    glucose monitor monitoring  kit Use 1 each As Needed (GDM). 1 each 0    Lancets misc Use 1 each 4 (Four) Times a Day. 200 each 2             Vital Signs  Temp:  [98.2 °F (36.8 °C)] 98.2 °F (36.8 °C)  Heart Rate:  [65] 65  Resp:  [16] 16  BP: (125)/(78) 125/78    Physical Exam:  Constitutional: She is oriented to person, place, and time. She appears well-developed and well-nourished.   HENT:   Head: Normocephalic and atraumatic.   Cardiovascular: Normal rate and regular rhythm.    Pulmonary/Chest: Effort normal. No respiratory distress.   Abdominal: Soft. Bowel sounds are normal. There is no tenderness. There is no rebound and no guarding.   Musculoskeletal: Normal range of motion.   Neurological: She is alert and oriented to person, place, and time.   Skin: Skin is warm and dry.   Psychiatric: She has a normal mood and affect. Her behavior is normal. Judgment and thought content normal.   Nursing note and vitals reviewed.  Debilities/Disabilities Identified: None  Emotional Behavior: Appropriate    Labs: 12.       Assessment/Plan:   28yo  at 39+4 presents for IOL.  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  Maternal VSS WNL.   FWB demonstrated by reactive cat I NST.  SVE 2-3/70/-3.  Cephalic by TAUS.  EFW 2644g 5.13# 8May MFM US; pelvis proven to 6.8#.  Anticipate .    -Admit to L&D.  Consent signed and on chart.  -PIV, LR.  -T&S, CBC.  -Anesthesia consult.    -GBS Neg; prophylaxis not indicated.  -MBT O+; Rhophylac not indicated.  -Rubella Imm; MMR not indicated.  -Induce via Cytotec/FB and Pitocin/amniotomy as indicated.  -CD for maternal/fetal indications.  -Varicella vaccines prior to discharge home.    Consent for Vaginal Delivery  Pt. counseled as to risk of labor and delivery including but not limited to infection, bleeding (with possible need for blood transfusion, and its  inherent risks of virus transmission, i.e. HIV, Hepatitis; possible transfusion reaction), possible emergent  section with its risks of damage to internal organs and necessary repairs, possible operative vaginal delivery, NON routine use of episiotomy and routine use of internal monitors and associated risks, anesthetic complications, injury to infant or mother at time of delivery, maternal or fetal death.  The pt. understands these risks and is willing to proceed.  All of her questions were answered.    Induction of Labor  Risks have been outlined to include (but not limited to) infection, uterine tetany with uterine rupture and/or fetal distress, need for emergent  delivery and possible  hysterectomy, possible postpartum hemorrhage secondary to uterine atony which could be substantial enough to require blood transfusion with the inherent risks of hepatitis, AIDS, HIV infection as well as transfusion reaction. She understands the need for use of this induction method and desires to proceed. There is no evidence of cephalopelvic disproportion or fetal distress, or any contraindication to the induction technique selected.    I discussed the patients findings and my recommendations with patient, family, and nursing staff.         Jericho Ceron DO  24  08:18 EDT

## 2024-06-03 NOTE — PLAN OF CARE
Problem: Adult Inpatient Plan of Care  Goal: Plan of Care Review  Outcome: Ongoing, Progressing  Flowsheets (Taken 6/3/2024 1734)  Progress: improving  Plan of Care Reviewed With: patient  Outcome Evaluation: VSS. SVE 5/60/-1. AROM at 1703. pt reports some pain with contractions but declines epidural or IV pain meds. pt okay to sit on birthing ball or stand at bedside per MD. Pit 6. continuing with POC  Goal: Patient-Specific Goal (Individualized)  Outcome: Ongoing, Progressing  Flowsheets (Taken 6/3/2024 1734)  Patient-Specific Goals (Include Timeframe): SVE at 1845  Individualized Care Needs: pt wants to natural labor  Goal: Absence of Hospital-Acquired Illness or Injury  Outcome: Ongoing, Progressing  Intervention: Identify and Manage Fall Risk  Recent Flowsheet Documentation  Taken 6/3/2024 1707 by Magdalena Goodrich RN  Safety Promotion/Fall Prevention: safety round/check completed  Taken 6/3/2024 0728 by Magdalena Goodrich RN  Safety Promotion/Fall Prevention: safety round/check completed  Intervention: Prevent and Manage VTE (Venous Thromboembolism) Risk  Recent Flowsheet Documentation  Taken 6/3/2024 1707 by Magdalena Goodrich RN  Activity Management: (pt allowed to sit on birthing ball) other (see comments)  Taken 6/3/2024 0728 by Magdalena Goodrich RN  Activity Management: up ad daina  Intervention: Prevent Infection  Recent Flowsheet Documentation  Taken 6/3/2024 1707 by Magdalena Goodrich RN  Infection Prevention:   cohorting utilized   environmental surveillance performed   equipment surfaces disinfected   hand hygiene promoted   personal protective equipment utilized   rest/sleep promoted   single patient room provided   visitors restricted/screened  Taken 6/3/2024 0728 by Magdalena Goodrich RN  Infection Prevention:   cohorting utilized   environmental surveillance performed   equipment surfaces disinfected   hand hygiene promoted   personal protective equipment utilized   rest/sleep promoted    single patient room provided   visitors restricted/screened  Goal: Optimal Comfort and Wellbeing  Outcome: Ongoing, Progressing  Intervention: Monitor Pain and Promote Comfort  Recent Flowsheet Documentation  Taken 6/3/2024 1707 by Magdalena Goodrich RN  Pain Management Interventions:   pain management plan reviewed with patient/caregiver   pillow support provided   position adjusted   quiet environment facilitated  Taken 6/3/2024 1329 by Magdalena Goodrich RN  Pain Management Interventions:   quiet environment facilitated   position adjusted   pain management plan reviewed with patient/caregiver  Intervention: Provide Person-Centered Care  Recent Flowsheet Documentation  Taken 6/3/2024 0728 by Magdalena Goodrich RN  Trust Relationship/Rapport:   care explained   choices provided   emotional support provided   empathic listening provided   questions encouraged   questions answered   reassurance provided   thoughts/feelings acknowledged  Goal: Readiness for Transition of Care  Outcome: Ongoing, Progressing  Intervention: Mutually Develop Transition Plan  Recent Flowsheet Documentation  Taken 6/3/2024 0749 by Magdalena Goodrich RN  Equipment Currently Used at Home: none  Taken 6/3/2024 0747 by Magdalena Goodrich RN  Transportation Anticipated: family or friend will provide  Patient/Family Anticipates Transition to: home with family     Problem: Bleeding (Labor)  Goal: Hemostasis  Outcome: Ongoing, Progressing     Problem: Change in Fetal Wellbeing (Labor)  Goal: Stable Fetal Wellbeing  Outcome: Ongoing, Progressing     Problem: Delayed Labor Progression (Labor)  Goal: Effective Progression to Delivery  Outcome: Ongoing, Progressing     Problem: Infection (Labor)  Goal: Absence of Infection Signs and Symptoms  Outcome: Ongoing, Progressing  Intervention: Prevent or Manage Infection  Recent Flowsheet Documentation  Taken 6/3/2024 1707 by Magdalena Goodrich RN  Infection Prevention:   cohorting utilized    environmental surveillance performed   equipment surfaces disinfected   hand hygiene promoted   personal protective equipment utilized   rest/sleep promoted   single patient room provided   visitors restricted/screened  Taken 6/3/2024 0728 by Magdalena Goodrich, RN  Infection Prevention:   cohorting utilized   environmental surveillance performed   equipment surfaces disinfected   hand hygiene promoted   personal protective equipment utilized   rest/sleep promoted   single patient room provided   visitors restricted/screened     Problem: Labor Pain (Labor)  Goal: Acceptable Pain Control  Outcome: Ongoing, Progressing     Problem: Uterine Tachysystole (Labor)  Goal: Normal Uterine Contraction Pattern  Outcome: Ongoing, Progressing   Goal Outcome Evaluation:  Plan of Care Reviewed With: patient        Progress: improving  Outcome Evaluation: VSS. SVE 5/60/-1. AROM at 1703. pt reports some pain with contractions but declines epidural or IV pain meds. pt okay to sit on birthing ball or stand at bedside per MD. Pit 6. continuing with POC

## 2024-06-03 NOTE — PROGRESS NOTES
CTSP for AROM. R/B/A of AROM again reviewed and she is agreeable. Declines epidural. Cx 60%, 5-6 cm and - 1 station with BBOW. AROM with clear fluid. FSR.    Melody Lee MD

## 2024-06-03 NOTE — PROGRESS NOTES
/-1; recommend AROM; declines   Pitocin 6mu at this time   Discussed with pt that medical IOL for GDM we recommend delivery as soon as safely possible. She has decline amniotomy multiple times today   Declines epidural   FWB with moderate variability   Anticipate ; operative as indicated

## 2024-06-03 NOTE — PROCEDURES
COOK Catheter Placement     Indications: IOL    Post procedure diagnosis:  SHARON    Procedure Details     The risks and benefits of the procedure and Verbal informed consent obtained.    Speculum placed in vagina and excellent visualization of cervix achieved. Cook catheter placed in standard fashion; inflating both balloons with sterile water.     FWB noted while placing Cook catheter.     Tolerated well  No apparent complication     Findings: Unfavorable CVX    Complications: none.      Plan:  1) Continue with IOL   2) Anticipate ; Operative delivery as indicated   3) Pain medications as needed.       Jericho Ceron DO   6/3/2024  08:25 EDT

## 2024-06-03 NOTE — L&D DELIVERY NOTE
[facility]   Vaginal Delivery Note    Patient Name: Estrellita Bobby  : 1995  MRN: [id number]    Date of Delivery: 6/3/2024     Diagnosis     Pre & Post-Delivery:  Intrauterine pregnancy at 39w4d  Labor status: Induced Onset of Labor     Normal labor and delivery    Iron deficiency anemia during pregnancy    Established care 30+wga     Uses Maldivian as primary spoken language    Gestational diabetes mellitus, class A2    Chlamydia infection during pregnancy             Problem List    Transfer to Postpartum     Review the Delivery Report for details.     Delivery     Delivery: Vaginal, Spontaneous     YOB: 2025    Time of Birth:  Gestational Age 6:47 PM   39w4d     Anesthesia: None     Delivering clinician: Melody Lee    Forceps?   No   Vacuum? No    Shoulder dystocia present: No        Delivery narrative:  Ms. Gabo Henson is a 29-year-old -0-0-1 with an intrauterine pregnancy at 39.4 weeks here for induction of labor secondary to gestational diabetes mellitus class A2.  She has been on glyburide 2.5 mg at bedtime.  Her prenatal care has been complicated by anemia with admission hemoglobin 12.1.  Admission glucose was 75.  She is also varicella nonimmune.  She had a history of chlamydia during pregnancy with a persistently positive test of cure.  GCC was drawn on admission and is pending.  She declined induction of labor last week secondary to scheduling issues.  She was 3 cm on admission.  She is GBS negative.  She received a Cook cervical ripening balloon and Pitocin.  She declined an epidural.  Her Cook fell out and she was approximately 5 cm.  Amniotomy was performed at 5 to 6 cm and she progressed along a normal labor curve to complete complete and +2.  First time she pushed there was a significant amount of bleeding from the anterior compartment.  She then pushed twice to deliver a live viable male infant.  Mouth and nose were bulb suction on the  "perineum and the shoulders were easily delivered.  The cord was noted to be very short and it was doubly divided and cut.  The infant was handed to waiting mom to initiate Kangaroo care.  The placenta delivered spontaneously and intact.  It was noted to have a three-vessel cord.  It was sent for pathology.  Inspection of the cervix, perineum and sidewalls revealed a clitoral laceration.  A Townsend catheter was placed and the clitoris was numbed with lidocaine.  3-0 Vicryl was then used to repair this tear.  There was also a second-degree midline laceration which was repaired with 3-0 Vicryl as well.  EBL was 300 cc.  The fundus was firm and lochia was scant.  Both mom and infant tolerated the procedure well.  Apgars were 8 and 9.  All counts were correct for the procedure.  Mom and baby were left to recover together in labor and delivery prior to transfer the postpartum unit.      Infant     Findings: male  infant     Infant observations: Weight: No birth weight on file.   Length:   in  Observations/Comments:        Apgars: 8  @ 1 minute /    9  @ 5 minutes   Infant Name: Yuan     Placenta & Cord         Placenta delivered    at   SI/3VC     Cord: 3 vessels  present.   Nuchal Cord?  no   Cord blood obtained: No    Cord gases obtained:  No    Cord gas results: Venous:  No results found for: \"PHCVEN\", \"BECVEN\"    Arterial:  No results found for: \"PHCART\", \"BECART\"     Repair     Episiotomy: None     No    Lacerations: Yes  Laceration Information  Laceration Repaired?   Perineal: 2nd  Yes    Periurethral:   Yes    Labial:       Sulcus:       Vaginal:       Cervical:         Suture used for repair: 3-0 Vicryl  Laceration Length for 3rd or 4th degree lacerations: N/A  cm   Estimated Blood Loss: Est. Blood Loss (mL): 300 mL (Filed from Delivery Summary) (06/03/24 7009)     Quantitative Blood Loss:          Complications     none    Disposition     Mother to Remain in LD  in stable condition currently.  Baby to remains " with mom  in stable condition currently.    Melody Lee MD  06/03/24  19:08 EDT

## 2024-06-04 LAB
GLUCOSE BLDC GLUCOMTR-MCNC: 70 MG/DL (ref 70–130)
HCT VFR BLD AUTO: 32.8 % (ref 34–46.6)
HGB BLD-MCNC: 10.5 G/DL (ref 12–15.9)

## 2024-06-04 PROCEDURE — 85018 HEMOGLOBIN: CPT | Performed by: OBSTETRICS & GYNECOLOGY

## 2024-06-04 PROCEDURE — 85014 HEMATOCRIT: CPT | Performed by: OBSTETRICS & GYNECOLOGY

## 2024-06-04 PROCEDURE — 82948 REAGENT STRIP/BLOOD GLUCOSE: CPT

## 2024-06-04 PROCEDURE — 0503F POSTPARTUM CARE VISIT: CPT | Performed by: NURSE PRACTITIONER

## 2024-06-04 RX ADMIN — Medication 324 MG: at 08:00

## 2024-06-04 RX ADMIN — DOCUSATE SODIUM 100 MG: 100 CAPSULE, LIQUID FILLED ORAL at 08:01

## 2024-06-04 RX ADMIN — WITCH HAZEL 1 PAD: 500 SOLUTION RECTAL; TOPICAL at 05:28

## 2024-06-04 RX ADMIN — PRENATAL VIT W/ FE FUMARATE-FA TAB 27-0.8 MG 1 TABLET: 27-0.8 TAB at 08:00

## 2024-06-04 RX ADMIN — Medication: at 05:28

## 2024-06-04 RX ADMIN — IBUPROFEN 600 MG: 600 TABLET, FILM COATED ORAL at 13:29

## 2024-06-04 RX ADMIN — IBUPROFEN 600 MG: 600 TABLET, FILM COATED ORAL at 05:27

## 2024-06-04 NOTE — PLAN OF CARE
Problem: Adult Inpatient Plan of Care  Goal: Plan of Care Review  Outcome: Ongoing, Progressing  Flowsheets (Taken 6/4/2024 0346)  Progress: improving  Plan of Care Reviewed With: patient  Outcome Evaluation: vital signs wnl, fundus firm, light lochia, breastfeeding with supplementation, bonding well   Goal Outcome Evaluation:  Plan of Care Reviewed With: patient        Progress: improving  Outcome Evaluation: vital signs wnl, fundus firm, light lochia, breastfeeding with supplementation, bonding well

## 2024-06-04 NOTE — PROGRESS NOTES
"[facility]  Vaginal Delivery Progress Note    Subjective   Subjective  Postpartum Day 1: Vaginal Delivery    The patient feels well.  Her pain is well controlled with nonsteroidal anti-inflammatory drugs.   She is ambulating well.  Patient describes her bleeding as staining only.    Breastfeeding: infant latching without difficulty without pain.    Objective     Objective   Vital Signs Range for the last 24 hours  Temperature: Temp:  [97.6 °F (36.4 °C)-98.8 °F (37.1 °C)] 97.6 °F (36.4 °C)   Temp Source: Temp src: Oral   BP: BP: ()/(49-92) 113/61   Pulse: Heart Rate:  [60-82] 70   Respirations: Resp:  [14-18] 18   SPO2: SpO2:  [97 %-99 %] 98 %   O2 Amount (l/min):     O2 Devices Device (Oxygen Therapy): room air   Weight:       Admit Height:       Physical Exam:  General:  no acute distresss.  Abdomen: Fundus: appropriate, firm, non tender  Extremities: normal, atraumatic, no cyanosis, and trace edema. Neg Nj's sign.       Lab results reviewed:  Yes   Rubella:  No results found for: \"RUBELLAIGGIN\" Nurse Transcribed from prenatal record --    Rubella Antibodies, IgG   Date Value Ref Range Status   04/12/2024 2.08 Immune >0.99 index Final     Comment:                                     Non-immune       <0.90                                  Equivocal  0.90 - 0.99                                  Immune           >0.99       Rh Status:    RH type   Date Value Ref Range Status   06/03/2024 Positive  Final     Rh Factor   Date Value Ref Range Status   04/12/2024 Positive  Final     Comment:     Please note: Prior records for this patient's ABO / Rh type are not  available for additional verification.       Immunizations:   Immunization History   Administered Date(s) Administered    Fluzone (or Fluarix & Flulaval for VFC) >6mos 12/06/2022    Tdap 12/06/2022       Assessment & Plan   Assessment & Plan    Normal labor and delivery    Iron deficiency anemia during pregnancy    Established care 30+wga     Uses " Brazilian as primary spoken language    Gestational diabetes mellitus, class A2    Chlamydia infection during pregnancy      Estrellita Bobby is Day 1  post-partum  Vaginal, Spontaneous   .      Plan:  1) Postpartum day #1- S/P . Rh +. Hgb 10.5g/dL.    2) Postpartum care- advance.     3) Male infant- Breast. Desires circumcision.     4) GDMA2- Needs 2hr GTT @ 6 weeks postpartum    5) Chlamydia infection- New test from admission pending.     6) Maternal anemia- Continue iron.     7) Varicella non immune- Give varivax postpartum.     8)  Dispo- Plan home tomorrow       Cinthya Esteban, STANLEY  2024  08:33 EDT

## 2024-06-05 VITALS
TEMPERATURE: 97.7 F | SYSTOLIC BLOOD PRESSURE: 109 MMHG | RESPIRATION RATE: 20 BRPM | HEART RATE: 67 BPM | DIASTOLIC BLOOD PRESSURE: 58 MMHG | OXYGEN SATURATION: 96 %

## 2024-06-05 PROCEDURE — 90715 TDAP VACCINE 7 YRS/> IM: CPT | Performed by: STUDENT IN AN ORGANIZED HEALTH CARE EDUCATION/TRAINING PROGRAM

## 2024-06-05 PROCEDURE — 25010000002 TETANUS-DIPHTH-ACELL PERTUSSIS 5-2.5-18.5 LF-MCG/0.5 SUSPENSION PREFILLED SYRINGE: Performed by: STUDENT IN AN ORGANIZED HEALTH CARE EDUCATION/TRAINING PROGRAM

## 2024-06-05 PROCEDURE — 90471 IMMUNIZATION ADMIN: CPT | Performed by: STUDENT IN AN ORGANIZED HEALTH CARE EDUCATION/TRAINING PROGRAM

## 2024-06-05 PROCEDURE — 0503F POSTPARTUM CARE VISIT: CPT | Performed by: NURSE PRACTITIONER

## 2024-06-05 RX ORDER — PSEUDOEPHEDRINE HCL 30 MG
100 TABLET ORAL 2 TIMES DAILY
Qty: 60 CAPSULE | Refills: 0 | Status: SHIPPED | OUTPATIENT
Start: 2024-06-05

## 2024-06-05 RX ORDER — AZITHROMYCIN 250 MG/1
1000 TABLET, FILM COATED ORAL
Status: COMPLETED | OUTPATIENT
Start: 2024-06-05 | End: 2024-06-05

## 2024-06-05 RX ORDER — IBUPROFEN 600 MG/1
600 TABLET ORAL EVERY 6 HOURS PRN
Qty: 30 TABLET | Refills: 0 | Status: SHIPPED | OUTPATIENT
Start: 2024-06-05

## 2024-06-05 RX ORDER — VARICELLA VIRUS VACCINE LIVE 1350 [PFU]/.5ML
0.5 INJECTION, POWDER, LYOPHILIZED, FOR SUSPENSION SUBCUTANEOUS ONCE
Qty: 0.5 ML | Refills: 0 | Status: SHIPPED | OUTPATIENT
Start: 2024-06-05 | End: 2024-06-05

## 2024-06-05 RX ADMIN — AZITHROMYCIN DIHYDRATE 1000 MG: 250 TABLET ORAL at 09:54

## 2024-06-05 RX ADMIN — IBUPROFEN 600 MG: 600 TABLET, FILM COATED ORAL at 01:49

## 2024-06-05 RX ADMIN — PRENATAL VIT W/ FE FUMARATE-FA TAB 27-0.8 MG 1 TABLET: 27-0.8 TAB at 08:02

## 2024-06-05 RX ADMIN — Medication 324 MG: at 08:02

## 2024-06-05 RX ADMIN — DOCUSATE SODIUM 100 MG: 100 CAPSULE, LIQUID FILLED ORAL at 08:02

## 2024-06-05 RX ADMIN — TETANUS TOXOID, REDUCED DIPHTHERIA TOXOID AND ACELLULAR PERTUSSIS VACCINE, ADSORBED 0.5 ML: 5; 2.5; 8; 8; 2.5 SUSPENSION INTRAMUSCULAR at 10:04

## 2024-06-05 RX ADMIN — IBUPROFEN 600 MG: 600 TABLET, FILM COATED ORAL at 08:02

## 2024-06-05 NOTE — DISCHARGE SUMMARY
Obstetrical Discharge Form    Primary OB Clinician: MARYANNE    Gestational Age: 39.4  weeks     Antepartum complications: anemia, GDMA2, + Chlamydia    Date of Delivery:  6/3/2024     Delivered By: Melody Lee     Delivery Type: spontaneous vaginal delivery    Tubal Ligation: n/a    Baby: Liveborn male, Apgars 8/9, weight 7 #, 4.7 oz,       Anesthesia: none    Intrapartum complications: None    Laceration: 2nd degree    Feeding method: breast    Discharge Date: 2024; Discharge Time: 08:57 EDT    /58 (BP Location: Left arm, Patient Position: Lying)   Pulse 67   Temp 97.7 °F (36.5 °C) (Oral)   Resp 20   LMP 10/07/2023   SpO2 96%   Breastfeeding Yes      Abd: soft, fundus firm   Ext: Trace edema, neg Nj's sign   Results from last 7 days   Lab Units 24  0515   HEMOGLOBIN g/dL 10.5*       Impression: 1) Postpartum day #2- S/P . Rh +.     2) Maternal anemia- Continue iron    3) Male infant- Breast. S/P circ.     4) + CT- Treated prior to discharge. Partner needs treatment as well (RX zithromax 1gm given). Needs LENORA @ postpartum visit.     5) GDMA2- Needs 2hr GTT    6) Contraception- Pt uncertain. Disc options including POPs, Depo, Nexplanon and IUD.       Plan:    Patient given written instruction sheet.  Follow-up appointment with [facility] in 6 weeks.    STANLEY Irving  08:57 EDT  2024    Discharge time was less than 30 minutes

## 2024-06-05 NOTE — CASE MANAGEMENT/SOCIAL WORK
Case Management Discharge Note      Final Note: dc home         Selected Continued Care - Discharged on 6/5/2024 Admission date: 6/3/2024 - Discharge disposition: Home or Self Care      Destination    No services have been selected for the patient.                Durable Medical Equipment    No services have been selected for the patient.                Dialysis/Infusion    No services have been selected for the patient.                Home Medical Care    No services have been selected for the patient.                Therapy    No services have been selected for the patient.                Community Resources    No services have been selected for the patient.                Community & DME    No services have been selected for the patient.                         Final Discharge Disposition Code: 01 - home or self-care  
Rec'd call from Tayler BOYLE regarding order for varicella vaccine, cost per [facility] $187. Patient initially listed as self pay. Spoke with Mariel Napier/ and Tabitha/Tyrese. Patient has been screened for medicaid and vaccine should be covered once medicaid is active. Instruct patient to obtain vaccine out patient during active status of June and July 2024.  
Tabitha/Tyrese informed of patient self-pay status.  
[facility]

## 2024-06-06 LAB
C TRACH RRNA SPEC QL NAA+PROBE: NEGATIVE
N GONORRHOEA RRNA SPEC QL NAA+PROBE: NEGATIVE

## 2024-06-12 LAB
LAB AP CASE REPORT: NORMAL
PATH REPORT.FINAL DX SPEC: NORMAL

## 2024-07-18 ENCOUNTER — POSTPARTUM VISIT (OUTPATIENT)
Dept: OBSTETRICS AND GYNECOLOGY | Facility: CLINIC | Age: 29
End: 2024-07-18
Payer: MEDICAID

## 2024-07-18 VITALS
WEIGHT: 121 LBS | HEIGHT: 65 IN | SYSTOLIC BLOOD PRESSURE: 110 MMHG | BODY MASS INDEX: 20.16 KG/M2 | DIASTOLIC BLOOD PRESSURE: 60 MMHG

## 2024-07-18 DIAGNOSIS — O98.819 CHLAMYDIA INFECTION DURING PREGNANCY: ICD-10-CM

## 2024-07-18 DIAGNOSIS — A74.9 CHLAMYDIA INFECTION DURING PREGNANCY: ICD-10-CM

## 2024-07-18 DIAGNOSIS — Z01.419 ROUTINE GYNECOLOGICAL EXAMINATION: ICD-10-CM

## 2024-07-18 DIAGNOSIS — Z11.51 SCREENING FOR HUMAN PAPILLOMAVIRUS (HPV): ICD-10-CM

## 2024-07-18 DIAGNOSIS — O24.419 GESTATIONAL DIABETES MELLITUS, CLASS A2: ICD-10-CM

## 2024-07-18 LAB
B-HCG UR QL: NEGATIVE
BILIRUB BLD-MCNC: NEGATIVE MG/DL
CLARITY, POC: CLEAR
COLOR UR: YELLOW
EXPIRATION DATE: NORMAL
GLUCOSE UR STRIP-MCNC: NEGATIVE MG/DL
INTERNAL NEGATIVE CONTROL: NEGATIVE
INTERNAL POSITIVE CONTROL: POSITIVE
KETONES UR QL: NEGATIVE
LEUKOCYTE EST, POC: NEGATIVE
Lab: 55
NITRITE UR-MCNC: NEGATIVE MG/ML
PH UR: 5 [PH] (ref 5–8)
PROT UR STRIP-MCNC: NEGATIVE MG/DL
RBC # UR STRIP: NEGATIVE /UL
SP GR UR: 1 (ref 1–1.03)
UROBILINOGEN UR QL: NORMAL

## 2024-07-22 LAB
CYTOLOGIST CVX/VAG CYTO: NORMAL
CYTOLOGY CVX/VAG DOC CYTO: NORMAL
CYTOLOGY CVX/VAG DOC THIN PREP: NORMAL
DX ICD CODE: NORMAL
HPV I/H RISK 4 DNA CVX QL PROBE+SIG AMP: NEGATIVE
Lab: NORMAL
OTHER STN SPEC: NORMAL
STAT OF ADQ CVX/VAG CYTO-IMP: NORMAL

## 2024-07-28 NOTE — PROGRESS NOTES
"Chief Complaint   Patient presents with    Postpartum Care        Date of Delivery: 6/3/2024  Type of Delivery:  Spontaneous Vaginal Delivery  Infant:  male  Postpartum complications: chlamydia, GMDA2    HPI  The patient feels well. Patient describes her bleeding as staining only. Patient does not report depression    Breastfeeding: without difficulty. She wants to start POPs. She needs to get Varivax vaccine. She did not arrive fasting for her 2 hour GTT.     Review of Systems   Constitutional:  Positive for activity change. Negative for fatigue.   Genitourinary:  Negative for menstrual problem, pelvic pain, vaginal bleeding, vaginal discharge and vaginal pain.   Psychiatric/Behavioral:  Negative for dysphoric mood and sleep disturbance. The patient is not nervous/anxious.        The following portions of the patient's history were reviewed and updated as appropriate: allergies, current medications and problem list.             /60   Ht 165.1 cm (65\")   Wt 54.9 kg (121 lb)   LMP 10/07/2023   Breastfeeding Yes   BMI 20.14 kg/m²       Physical Exam  Vitals and nursing note reviewed. Exam conducted with a chaperone present.   Constitutional:       Appearance: Normal appearance. She is well-developed and normal weight.   HENT:      Head: Normocephalic and atraumatic.   Eyes:      General: No scleral icterus.     Conjunctiva/sclera: Conjunctivae normal.   Neck:      Thyroid: No thyromegaly.   Cardiovascular:      Rate and Rhythm: Normal rate and regular rhythm.   Pulmonary:      Effort: Pulmonary effort is normal.      Breath sounds: Normal breath sounds.   Chest:   Breasts:     Right: No swelling, bleeding, inverted nipple, mass, nipple discharge, skin change or tenderness.      Left: No swelling, bleeding, inverted nipple, mass, nipple discharge, skin change or tenderness.   Abdominal:      General: Bowel sounds are normal. There is no distension.      Palpations: Abdomen is soft. There is no mass.      " Tenderness: There is no abdominal tenderness. There is no guarding or rebound.      Hernia: No hernia is present.   Genitourinary:     Exam position: Supine.      Labia:         Right: No rash, tenderness or lesion.         Left: No rash, tenderness or lesion.       Urethra: No prolapse, urethral pain, urethral swelling or urethral lesion.      Vagina: No signs of injury and foreign body. No vaginal discharge, erythema, tenderness or bleeding.      Cervix: No cervical motion tenderness, discharge or friability.      Uterus: Not deviated, not enlarged, not fixed and not tender.       Adnexa:         Right: No mass, tenderness or fullness.          Left: No mass, tenderness or fullness.     Musculoskeletal:      Cervical back: Neck supple.   Skin:     General: Skin is warm and dry.   Neurological:      Mental Status: She is alert and oriented to person, place, and time.   Psychiatric:         Behavior: Behavior normal.         Thought Content: Thought content normal.         Judgment: Judgment normal.             Orders Placed This Encounter   Procedures    POC Urinalysis Dipstick     Order Specific Question:   Release to patient     Answer:   Routine Release [[id number]]    POC Pregnancy, Urine     Order Specific Question:   Release to patient     Answer:   Routine Release [[id number]]       1) 6 week postpartum exam - pap/HPV/C/G/T   2) Screen for postpartum depression - no issues per pt  3) Contraception - oral progesterone-only contraceptive  4) GDMA2- no meds. R/S 2 hour GTT  5) Check STD panel   6) VNI- enc pt to go to outpt lab to get Varivax  7)Return to office - 1 year and/or prn.   8) EPIC LOS calculator used to determine coding level.             Melody Lee MD  07/18/2024    19:02 EDT